# Patient Record
Sex: FEMALE | Race: WHITE | NOT HISPANIC OR LATINO | Employment: PART TIME | URBAN - METROPOLITAN AREA
[De-identification: names, ages, dates, MRNs, and addresses within clinical notes are randomized per-mention and may not be internally consistent; named-entity substitution may affect disease eponyms.]

---

## 2020-02-05 ENCOUNTER — APPOINTMENT (OUTPATIENT)
Dept: GENERAL RADIOLOGY | Facility: HOSPITAL | Age: 70
End: 2020-02-05

## 2020-02-05 ENCOUNTER — HOSPITAL ENCOUNTER (EMERGENCY)
Facility: HOSPITAL | Age: 70
Discharge: HOME | End: 2020-02-05
Admitting: EMERGENCY MEDICINE

## 2020-02-05 VITALS
TEMPERATURE: 97.4 F | OXYGEN SATURATION: 95 % | HEART RATE: 77 BPM | RESPIRATION RATE: 16 BRPM | BODY MASS INDEX: 33.48 KG/M2 | SYSTOLIC BLOOD PRESSURE: 135 MMHG | HEIGHT: 66 IN | DIASTOLIC BLOOD PRESSURE: 82 MMHG | WEIGHT: 208.34 LBS

## 2020-02-05 DIAGNOSIS — S92.912A FRACTURE OF PROXIMAL PHALANX OF TOE OF LEFT FOOT: Primary | ICD-10-CM

## 2020-02-05 PROCEDURE — 73630 X-RAY EXAM OF FOOT: CPT

## 2020-02-05 PROCEDURE — 99283 EMERGENCY DEPT VISIT LOW MDM: CPT

## 2020-02-05 NOTE — DISCHARGE INSTRUCTIONS
Wear splint.  Elevate left foot.  Follow-up with podiatry.  Call tomorrow for appointment.  Return for new or worsening symptoms.

## 2020-02-05 NOTE — ED NOTES
Was putting together a new recliner together and dropped the back of recliner on here left foot. Has swelling and  Bruising to top of left foot and toes     Radha Martines, RN  02/05/20 7555

## 2020-02-05 NOTE — ED PROVIDER NOTES
Subjective   Patient is a 69-year-old white female with history of hypertension, asthma, connective tissue disease, arthritis who presents today with complaints of pain to her left foot and toes.  Patient states yesterday she dropped the back end of a recliner on the end of her foot and toes.  Complains of pain and bruising.  She denies any numbness tingling or weakness distal to the injuries.  She denies any other injury or complaint.          Review of Systems   Constitutional: Negative for fever.   Musculoskeletal:        Pain and bruising left foot   Neurological: Negative for weakness and numbness.       Past Medical History:   Diagnosis Date   • Arthritis    • Asthma    • Connective tissue disease (CMS/HCC)    • Pulmonary fibrosis (CMS/HCC)        Allergies   Allergen Reactions   • Flexeril [Cyclobenzaprine] Other (See Comments)     Hypotension   • Morphine Hallucinations   • Penicillins Other (See Comments)     Childhood   • Sulfa Antibiotics Other (See Comments)     Childhood       Past Surgical History:   Procedure Laterality Date   • APPENDECTOMY     • BARIATRIC SURGERY     • CHOLECYSTECTOMY     • KNEE SURGERY     • SHOULDER SURGERY         History reviewed. No pertinent family history.    Social History     Socioeconomic History   • Marital status:      Spouse name: Not on file   • Number of children: Not on file   • Years of education: Not on file   • Highest education level: Not on file   Tobacco Use   • Smoking status: Never Smoker   Substance and Sexual Activity   • Alcohol use: Yes   • Drug use: Never           Objective   Physical Exam   Constitutional: She appears well-developed.   Vital signs and triage nurse note reviewed.  Constitutional: Awake, alert; well-developed and well-nourished. No acute distress is noted.  Cardiovascular: Regular rate and rhythm  Pulmonary: Respiratory effort regular nonlabored  Musculoskeletal: Independent range of motion of all extremities.  Tenderness over the  dorsal aspect of the left distal foot and all toes.  Pain is greatest over the first and second digits.  Nails are intact.  There is small amount of bruising noted.  There is no edema.  No open wounds.  Distal neurovascular motor intact.  Neuro: Alert oriented x3, speech is clear and appropriate, GCS 15.    Skin: Flesh tone, warm, dry, intact; no erythematous or petechial rash or lesion.        Procedures           ED Course      Labs Reviewed - No data to display  Xr Foot 3+ View Left    Result Date: 2/5/2020  1. Question possible nondisplaced fracture through the proximal phalanx of the second digit. The appearance could also relate to prior arthrodesis across the PIP joint. Correlation with surgical history is recommended. 2. Postsurgical changes of prior bunionectomy and old healed fracture of the fifth metatarsal shaft.  Electronically Signed By-Yosvany Sheehan On:2/5/2020 4:18 PM This report was finalized on [id number] by  Yosvany Sheehan, .    Medications - No data to display                                           MDM  Number of Diagnoses or Management Options  Fracture of proximal phalanx of toe of left foot:   Diagnosis management comments: Comorbidities: Arthritis, hypertension, connective tissue disorder  Differentials: Fracture, dislocation, contusion;this list is not all inclusive and does not constitute the entirety of considered causes  Discussion with provider: Not warranted  Radiology interpretation: X-rays reviewed by me and interpreted by radiologist: As above  Lab interpretation: Labs viewed by me significant for: Not warranted    Patient had x-rays obtained.    The patient had prerna tape and postop shoe applied.  Distal motor, sensory and vascular status intact after application.    Diagnosis and treatment plan discussed with patient.  Patient agreeable to plan.   I discussed findings with patient who voices understanding of discharge instructions, signs and symptoms requiring return to  ED; discharged improved and in stable condition with follow up for re-evaluation.           Amount and/or Complexity of Data Reviewed  Tests in the radiology section of CPT®: reviewed and ordered    Patient Progress  Patient progress: stable      Final diagnoses:   Fracture of proximal phalanx of toe of left foot            Abbey Taveras, APRN  02/05/20 2333

## 2020-02-13 ENCOUNTER — OFFICE VISIT (OUTPATIENT)
Dept: PODIATRY | Facility: CLINIC | Age: 70
End: 2020-02-13

## 2020-02-13 VITALS
SYSTOLIC BLOOD PRESSURE: 106 MMHG | WEIGHT: 206.8 LBS | HEART RATE: 83 BPM | DIASTOLIC BLOOD PRESSURE: 72 MMHG | HEIGHT: 66 IN | BODY MASS INDEX: 33.23 KG/M2

## 2020-02-13 DIAGNOSIS — S90.32XA CONTUSION OF LEFT FOOT, INITIAL ENCOUNTER: Primary | ICD-10-CM

## 2020-02-13 DIAGNOSIS — M20.42 HAMMER TOE OF LEFT FOOT: ICD-10-CM

## 2020-02-13 DIAGNOSIS — I73.9 PERIPHERAL VASCULAR DISEASE (HCC): ICD-10-CM

## 2020-02-13 PROBLEM — M19.019 OSTEOARTHRITIS OF SHOULDER: Status: ACTIVE | Noted: 2020-02-13

## 2020-02-13 PROBLEM — Z95.828 PRESENCE OF IVC FILTER: Status: ACTIVE | Noted: 2018-07-11

## 2020-02-13 PROBLEM — A04.8 H. PYLORI INFECTION: Status: ACTIVE | Noted: 2018-02-19

## 2020-02-13 PROBLEM — D68.9 CLOTTING DISORDER: Status: ACTIVE | Noted: 2018-02-19

## 2020-02-13 PROBLEM — Z98.84 H/O GASTRIC BYPASS: Status: ACTIVE | Noted: 2020-02-13

## 2020-02-13 PROBLEM — M19.90 ARTHRITIS: Status: ACTIVE | Noted: 2018-02-19

## 2020-02-13 PROBLEM — M47.816 LUMBAR ARTHROPATHY: Status: ACTIVE | Noted: 2017-04-03

## 2020-02-13 PROBLEM — R60.0 BILATERAL LOWER EXTREMITY EDEMA: Status: ACTIVE | Noted: 2018-07-11

## 2020-02-13 PROBLEM — J45.909 ASTHMA: Status: ACTIVE | Noted: 2018-07-11

## 2020-02-13 PROBLEM — R94.2 DIFFUSION CAPACITY OF LUNG (DL), DECREASED: Status: ACTIVE | Noted: 2018-02-19

## 2020-02-13 PROBLEM — I51.9 DIASTOLIC DYSFUNCTION, LEFT VENTRICLE: Status: ACTIVE | Noted: 2018-07-11

## 2020-02-13 PROBLEM — R51.9 GENERALIZED HEADACHES: Status: ACTIVE | Noted: 2018-02-19

## 2020-02-13 PROBLEM — R06.02 SOB (SHORTNESS OF BREATH): Status: ACTIVE | Noted: 2018-07-11

## 2020-02-13 PROBLEM — F32.A DEPRESSION: Status: ACTIVE | Noted: 2018-02-19

## 2020-02-13 PROBLEM — M51.36 DEGENERATIVE DISC DISEASE, LUMBAR: Status: ACTIVE | Noted: 2018-02-19

## 2020-02-13 PROBLEM — J84.10 FIBROSIS OF LUNG: Status: ACTIVE | Noted: 2018-02-17

## 2020-02-13 PROBLEM — M35.9 CONNECTIVE TISSUE DISEASE (HCC): Status: ACTIVE | Noted: 2018-02-19

## 2020-02-13 PROBLEM — E03.9 HYPOTHYROIDISM: Status: ACTIVE | Noted: 2020-02-13

## 2020-02-13 PROBLEM — I73.00 RAYNAUD'S DISEASE: Status: ACTIVE | Noted: 2020-02-13

## 2020-02-13 PROBLEM — M81.0 OSTEOPOROSIS: Status: ACTIVE | Noted: 2020-02-13

## 2020-02-13 PROBLEM — Z98.84 HX OF LAPAROSCOPIC GASTRIC BANDING: Status: ACTIVE | Noted: 2020-02-13

## 2020-02-13 PROBLEM — I83.90 VARICOSE VEINS: Status: ACTIVE | Noted: 2020-02-13

## 2020-02-13 PROBLEM — Z95.828 H/O SUPERIOR VENA CAVA FILTER PLACEMENT: Status: ACTIVE | Noted: 2020-02-13

## 2020-02-13 PROBLEM — G47.00 INSOMNIA: Status: ACTIVE | Noted: 2020-02-13

## 2020-02-13 PROCEDURE — 99203 OFFICE O/P NEW LOW 30 MIN: CPT | Performed by: PODIATRIST

## 2020-02-13 RX ORDER — CHOLECALCIFEROL (VITAMIN D3) 1250 MCG
50000 CAPSULE ORAL 3 TIMES WEEKLY
COMMUNITY
Start: 2020-01-15

## 2020-02-13 RX ORDER — ALENDRONATE SODIUM 70 MG/1
70 TABLET ORAL
COMMUNITY
Start: 2019-02-18

## 2020-02-13 RX ORDER — FUROSEMIDE 80 MG
80 TABLET ORAL DAILY
COMMUNITY
Start: 2016-02-29

## 2020-02-13 RX ORDER — ALBUTEROL SULFATE 90 UG/1
2 POWDER, METERED RESPIRATORY (INHALATION) EVERY 4 HOURS PRN
COMMUNITY
Start: 2020-02-12

## 2020-02-13 RX ORDER — OMEPRAZOLE 20 MG/1
20 CAPSULE, DELAYED RELEASE ORAL DAILY
COMMUNITY
Start: 2019-07-29

## 2020-02-13 RX ORDER — POTASSIUM CHLORIDE 20 MEQ/1
TABLET, EXTENDED RELEASE ORAL
COMMUNITY
Start: 2019-07-11

## 2020-02-13 RX ORDER — ROPINIROLE 1 MG/1
2 TABLET, FILM COATED ORAL NIGHTLY
COMMUNITY
Start: 2016-02-29

## 2020-02-13 RX ORDER — CYANOCOBALAMIN 1000 UG/ML
1000 INJECTION, SOLUTION INTRAMUSCULAR; SUBCUTANEOUS
COMMUNITY
Start: 2020-01-14

## 2020-02-13 RX ORDER — LEVOTHYROXINE SODIUM 112 UG/1
112 TABLET ORAL DAILY
COMMUNITY
Start: 2019-07-28

## 2020-02-13 RX ORDER — ALPRAZOLAM 1 MG/1
1 TABLET ORAL AS NEEDED
COMMUNITY
Start: 2019-12-19

## 2020-02-13 RX ORDER — DULOXETIN HYDROCHLORIDE 60 MG/1
60 CAPSULE, DELAYED RELEASE ORAL DAILY
COMMUNITY
Start: 2018-08-19

## 2020-02-13 RX ORDER — NIFEDIPINE 30 MG/1
30 TABLET, EXTENDED RELEASE ORAL DAILY
COMMUNITY
Start: 2019-07-29

## 2020-02-13 RX ORDER — CELECOXIB 200 MG/1
200 CAPSULE ORAL DAILY
COMMUNITY
Start: 2019-02-14

## 2020-02-13 NOTE — PATIENT INSTRUCTIONS
Hammer Toe    Hammer toe is a change in the shape (a deformity) of your toe. The deformity causes the middle joint of your toe to stay bent. This causes pain, especially when you are wearing shoes. Hammer toe starts gradually. At first, the toe can be straightened. Gradually over time, the deformity becomes stiff and permanent.  Early treatments to keep the toe straight may relieve pain. As the deformity becomes stiff and permanent, surgery may be needed to straighten the toe.  What are the causes?  Hammer toe is caused by abnormal bending of the toe joint that is closest to your foot. It happens gradually over time. This pulls on the muscles and connections (tendons) of the toe joint, making them weak and stiff. It is often related to wearing shoes that are too short or narrow and do not let your toes straighten.  What increases the risk?  You may be at greater risk for hammer toe if you:  · Are female.  · Are older.  · Wear shoes that are too small.  · Wear high-heeled shoes that pinch your toes.  · Are a ballet dancer.  · Have a second toe that is longer than your big toe (first toe).  · Injure your foot or toe.  · Have arthritis.  · Have a family history of hammer toe.  · Have a nerve or muscle disorder.  What are the signs or symptoms?  The main symptoms of this condition are pain and deformity of the toe. The pain is worse when wearing shoes, walking, or running. Other symptoms may include:  · Corns or calluses over the bent part of the toe or between the toes.  · Redness and a burning feeling on the toe.  · An open sore that forms on the top of the toe.  · Not being able to straighten the toe.  How is this diagnosed?  This condition is diagnosed based on your symptoms and a physical exam. During the exam, your health care provider will try to straighten your toe to see how stiff the deformity is. You may also have tests, such as:  · A blood test to check for rheumatoid arthritis.  · An X-ray to show how  severe the deformity is.  How is this treated?  Treatment for this condition will depend on how stiff the deformity is. Surgery is often needed. However, sometimes a hammer toe can be straightened without surgery. Treatments that do not involve surgery include:  · Taping the toe into a straightened position.  · Using pads and cushions to protect the toe (orthotics).  · Wearing shoes that provide enough room for the toes.  · Doing toe-stretching exercises at home.  · Taking an NSAID to reduce pain and swelling.  If these treatments do not help or the toe cannot be straightened, surgery is the next option. The most common surgeries used to straighten a hammer toe include:  · Arthroplasty. In this procedure, part of the joint is removed, and that allows the toe to straighten.  · Fusion. In this procedure, cartilage between the two bones of the joint is taken out and the bones are fused together into one longer bone.  · Implantation. In this procedure, part of the bone is removed and replaced with an implant to let the toe move again.  · Flexor tendon transfer. In this procedure, the tendons that curl the toes down (flexor tendons) are repositioned.  Follow these instructions at home:  · Take over-the-counter and prescription medicines only as told by your health care provider.  · Do toe straightening and stretching exercises as told by your health care provider.  · Keep all follow-up visits as told by your health care provider. This is important.  How is this prevented?  · Wear shoes that give your toes enough room and do not cause pain.  · Do not wear high-heeled shoes.  Contact a health care provider if:  · Your pain gets worse.  · Your toe becomes red or swollen.  · You develop an open sore on your toe.  This information is not intended to replace advice given to you by your health care provider. Make sure you discuss any questions you have with your health care provider.  Document Released: 12/15/2001 Document  Revised: 07/16/2018 Document Reviewed: 04/12/2017  [employer] Patient Education © 2019 [employer]

## 2020-02-13 NOTE — PROGRESS NOTES
02/13/2020  Foot and Ankle Surgery - New Patient   Provider: Dr. Steve Pérez DPM  Location: [facility]    Subjective:  Jo Prescott is a 69 y.o. female.     Chief Complaint   Patient presents with   • Left Foot - Pain       HPI: Patient is a 69-year-old female that presents after injury to the left foot.  She states that a piece of her recliner fell onto the dorsum of the left foot.  She reported to the ED after the injury and imaging was performed suspicious of a fracture to the second digit.  Patient states that the pain and swelling has improved but she continues to have discomfort to the forefoot.  She localizes the pain along the distal aspect of the great toe.  She has been ambulating in a postop shoe.  She does complain of longstanding discomfort involving the distal aspects of the third and fourth toes.  She states that she would like these addressed at some point in the future.  She has been told that she has circulation problems.  She denies any other issues today.    Allergies   Allergen Reactions   • Bee Venom Anaphylaxis   • Thien Flavor Swelling   • Adhesive Tape Other (See Comments)     Sensitive   • Flexeril [Cyclobenzaprine] Other (See Comments)     Hypotension   • Metoclopramide Other (See Comments)     Tardive diskinesia  Tardive diskinesia  tardive dyskinesia     • Morphine Hallucinations   • Penicillins Other (See Comments)     Childhood   • Sulfa Antibiotics Other (See Comments)     Childhood       Past Medical History:   Diagnosis Date   • Ankylosing spondylitis (CMS/HCC)    • Arthritis    • Asthma    • Connective tissue disease (CMS/HCC)    • Hypothyroidism 2/13/2020   • Pulmonary fibrosis (CMS/HCC)        Past Surgical History:   Procedure Laterality Date   • APPENDECTOMY     • BARIATRIC SURGERY     • CHOLECYSTECTOMY     • KNEE SURGERY     • SHOULDER SURGERY         Family History   Adopted: Yes   Family history unknown: Yes       Social History     Socioeconomic History    • Marital status:      Spouse name: Not on file   • Number of children: Not on file   • Years of education: Not on file   • Highest education level: Not on file   Tobacco Use   • Smoking status: Never Smoker   • Smokeless tobacco: Never Used   Substance and Sexual Activity   • Alcohol use: Yes   • Drug use: Never   • Sexual activity: Defer        Current Outpatient Medications on File Prior to Visit   Medication Sig Dispense Refill   • alendronate (FOSAMAX) 70 MG tablet TAKE 1 TABLET BY MOUTH  EVERY 7 DAYS     • ALPRAZolam (XANAX) 1 MG tablet      • BREO ELLIPTA 100-25 MCG/INH inhaler      • celecoxib (CeleBREX) 200 MG capsule Take 200 mg by mouth Daily.     • Cholecalciferol (VITAMIN D3) 1.25 MG (08819 UT) capsule TK 1 C PO 3 TIMES A WK     • cyanocobalamin 1000 MCG/ML injection INJECT 1ML IN THE MUSCLE Q 14 DAYS UTD     • DULoxetine (CYMBALTA) 60 MG capsule TAKE 1 CAPSULE BY MOUTH EVERY DAY     • furosemide (LASIX) 80 MG tablet TAKE 1 TABLET BY MOUTH  DAILY     • levothyroxine (SYNTHROID, LEVOTHROID) 112 MCG tablet TAKE 1 TABLET BY MOUTH 6  DAYS A WEEK AND 1/2 TABLET  ON SUNDAY.     • NIFEdipine XL (PROCARDIA XL) 30 MG 24 hr tablet TAKE 1 TABLET BY MOUTH  DAILY     • omeprazole (priLOSEC) 20 MG capsule TAKE 1 CAPSULE BY MOUTH  DAILY     • potassium chloride (K-DUR,KLOR-CON) 20 MEQ CR tablet TAKE 1 TABLET BY MOUTH TWICE DAILY     • PROAIR RESPICLICK 108 (90 Base) MCG/ACT inhaler      • rOPINIRole (REQUIP) 1 MG tablet TAKE 2 TABLETS BY MOUTH  NIGHTLY       No current facility-administered medications on file prior to visit.        Review of Systems:  General: Denies fever, chills, fatigue, and weakness.  Eyes: Denies vision loss, blurry vision, and excessive redness.  ENT: Denies hearing issues and difficulty swallowing.  Cardiovascular: Denies palpitations, chest pain, or syncopal episodes.  Respiratory: Denies shortness of breath, wheezing, and coughing.  GI: Denies abdominal pain, nausea, and vomiting.  "  : Denies frequency, hematuria, and urgency.  Musculoskeletal: + Left foot pain  Derm: Denies rash, open wounds, or suspicious lesions.  Neuro: Denies headaches, numbness, loss of coordination, and tremors.  Psych: Denies anxiety and depression.  Endocrine: Denies temperature intolerance and changes in appetite.  Heme: Denies bleeding disorders or abnormal bruising.     Objective   /72   Pulse 83   Ht 167.6 cm (66\")   Wt 93.8 kg (206 lb 12.8 oz)   BMI 33.38 kg/m²     Foot/Ankle Exam:       General:   Appearance: appears stated age and healthy    Orientation: AAOx3    Affect: appropriate    Gait: antalgic      VASCULAR      Left Foot Vascularity   Dorsalis pedis:  1+  Posterior tibial:  1+  Skin Temperature: warm    Edema Gradin+ and pitting  CFT:  3  Pedal Hair Growth:  Absent  Varicosities: moderate varicosities        NEUROLOGIC     Left Foot Neurologic   Light touch sensation:  Normal  Hot/cold sensation: normal    Protective Sensation using [place]edar-Melodie Monofilament:  10  Achilles reflex:  2+     MUSCULOSKELETAL      Left Foot Musculoskeletal   Ecchymosis:  Toe 1 and toe 2  Tenderness: toe 1    Arch:  Normal  Hammertoe:  Third toe and fourth toe (s/p correction of the second digit which is shortened)     MUSCLE STRENGTH     Left Foot Muscle Strength   Normal strength    Foot dorsiflexion:  5  Foot plantar flexion:  5  Foot inversion:  5  Foot eversion:  5     RANGE OF MOTION      Left Foot Range of Motion   Foot and ankle ROM within normal limits       DERMATOLOGIC     Left Foot Dermatologic   Skin: skin intact        Left Foot Additional Comments: Mild discomfort with palpation to the first digit.  No pain to the second digit.  Semirigid deformities involving the third and fourth toes.  No other pain, deformity, or instability.      Assessment/Plan   Jo was seen today for pain.    Diagnoses and all orders for this visit:    Contusion of left foot, initial encounter    Peripheral " vascular disease (CMS/[facility])  -     Duplex Lower Extremity Art / Grafts - Bilateral CAR; Future    Hammer toe of left foot      Patient presents approximately 1 week after injury to the left foot.  Imaging was reviewed which is suspicious for fracture involving the second digit; however, I do feel that this finding is due to previous hammer digit surgery given that she is relatively asymptomatic to this toe.  No other fractures or dislocations are identified.  I do feel that her primary issue for evaluation today is a contusion.  We did discuss the diagnosis and treatment options.  I have asked that she remain in the postop shoe with normal activity.  She is to call with any additional issues or concerns.  She states that she would like to discuss hammer digit surgery in the future for her third and fourth toe as they cause ongoing problems for her.  Her pedal pulses were weakly palpable today and she states that she does have a history of circulation issues.  I have recommended that we proceed with ABIs for further evaluation.  We will discuss the results of the ABIs and future plans for the toes given that the forefoot discomfort has improved at follow-up.  I will see her in 4 weeks for reevaluation.    No orders of the defined types were placed in this encounter.       Note is dictated utilizing voice recognition software. Unfortunately this leads to occasional typographical errors. I apologize in advance if the situation occurs. If questions occur please do not hesitate to call our office.

## 2020-02-21 ENCOUNTER — APPOINTMENT (OUTPATIENT)
Dept: CARDIOLOGY | Facility: HOSPITAL | Age: 70
End: 2020-02-21

## 2020-02-28 ENCOUNTER — APPOINTMENT (OUTPATIENT)
Dept: CARDIOLOGY | Facility: HOSPITAL | Age: 70
End: 2020-02-28

## 2020-03-06 ENCOUNTER — HOSPITAL ENCOUNTER (OUTPATIENT)
Dept: CARDIOLOGY | Facility: HOSPITAL | Age: 70
Discharge: HOME | End: 2020-03-06
Admitting: PODIATRIST

## 2020-03-06 DIAGNOSIS — I73.9 PERIPHERAL VASCULAR DISEASE (HCC): ICD-10-CM

## 2020-03-06 LAB
BH CV LOWER ARTERIAL LEFT ABI RATIO: 1.37
BH CV LOWER ARTERIAL LEFT DORSALIS PEDIS SYS MAX: 149 MMHG
BH CV LOWER ARTERIAL LEFT GREAT TOE SYS MAX: 68 MMHG
BH CV LOWER ARTERIAL LEFT POST TIBIAL SYS MAX: 162 MMHG
BH CV LOWER ARTERIAL LEFT TBI RATIO: 0.58
BH CV LOWER ARTERIAL RIGHT ABI RATIO: 1.39
BH CV LOWER ARTERIAL RIGHT DORSALIS PEDIS SYS MAX: 159 MMHG
BH CV LOWER ARTERIAL RIGHT GREAT TOE SYS MAX: 67 MMHG
BH CV LOWER ARTERIAL RIGHT POST TIBIAL SYS MAX: 164 MMHG
BH CV LOWER ARTERIAL RIGHT TBI RATIO: 0.57
UPPER ARTERIAL LEFT ARM BRACHIAL SYS MAX: 112 MMHG
UPPER ARTERIAL RIGHT ARM BRACHIAL SYS MAX: 118 MMHG

## 2020-03-06 PROCEDURE — 93922 UPR/L XTREMITY ART 2 LEVELS: CPT

## 2020-03-12 ENCOUNTER — OFFICE VISIT (OUTPATIENT)
Dept: PODIATRY | Facility: CLINIC | Age: 70
End: 2020-03-12

## 2020-03-12 VITALS
HEIGHT: 66 IN | DIASTOLIC BLOOD PRESSURE: 81 MMHG | SYSTOLIC BLOOD PRESSURE: 130 MMHG | WEIGHT: 208 LBS | BODY MASS INDEX: 33.43 KG/M2 | HEART RATE: 75 BPM

## 2020-03-12 DIAGNOSIS — M20.42 HAMMER TOE OF LEFT FOOT: Primary | ICD-10-CM

## 2020-03-12 PROCEDURE — 99213 OFFICE O/P EST LOW 20 MIN: CPT | Performed by: PODIATRIST

## 2020-03-12 NOTE — PROGRESS NOTES
03/12/2020  Foot and Ankle Surgery - Established Patient/Follow-up  Provider: Dr. Steve Pérez DPM  Location: [facility]    Subjective:  Jo Prescott is a 70 y.o. female.     Chief Complaint   Patient presents with   • Left Foot - Follow-up       HPI: Patient returns and states that she is doing better than last exam but she continues to have prominent discomfort involving her left second third and fourth toes.  She wants to proceed with toe correction if possible.  She did have the ABIs performed.  She denies any other issues with her feet.    Allergies   Allergen Reactions   • Bee Venom Anaphylaxis   • Thien Flavor Swelling   • Adhesive Tape Other (See Comments)     Sensitive   • Flexeril [Cyclobenzaprine] Other (See Comments)     Hypotension   • Metoclopramide Other (See Comments)     Tardive diskinesia  Tardive diskinesia  tardive dyskinesia     • Morphine Hallucinations   • Penicillins Other (See Comments)     Childhood   • Sulfa Antibiotics Other (See Comments)     Childhood       Current Outpatient Medications on File Prior to Visit   Medication Sig Dispense Refill   • alendronate (FOSAMAX) 70 MG tablet TAKE 1 TABLET BY MOUTH  EVERY 7 DAYS     • ALPRAZolam (XANAX) 1 MG tablet      • BREO ELLIPTA 100-25 MCG/INH inhaler      • celecoxib (CeleBREX) 200 MG capsule Take 200 mg by mouth Daily.     • Cholecalciferol (VITAMIN D3) 1.25 MG (29909 UT) capsule TK 1 C PO 3 TIMES A WK     • cyanocobalamin 1000 MCG/ML injection INJECT 1ML IN THE MUSCLE Q 14 DAYS UTD     • DULoxetine (CYMBALTA) 60 MG capsule TAKE 1 CAPSULE BY MOUTH EVERY DAY     • furosemide (LASIX) 80 MG tablet TAKE 1 TABLET BY MOUTH  DAILY     • levothyroxine (SYNTHROID, LEVOTHROID) 112 MCG tablet TAKE 1 TABLET BY MOUTH 6  DAYS A WEEK AND 1/2 TABLET  ON SUNDAY.     • NIFEdipine XL (PROCARDIA XL) 30 MG 24 hr tablet TAKE 1 TABLET BY MOUTH  DAILY     • omeprazole (priLOSEC) 20 MG capsule TAKE 1 CAPSULE BY MOUTH  DAILY     • potassium chloride  "(K-DUR,KLOR-CON) 20 MEQ CR tablet TAKE 1 TABLET BY MOUTH TWICE DAILY     • PROAIR RESPICLICK 108 (90 Base) MCG/ACT inhaler      • rOPINIRole (REQUIP) 1 MG tablet TAKE 2 TABLETS BY MOUTH  NIGHTLY       No current facility-administered medications on file prior to visit.        Objective   /81   Pulse 75   Ht 167.6 cm (66\")   Wt 94.3 kg (208 lb)   BMI 33.57 kg/m²     General:   Appearance: appears stated age and healthy    Orientation: AAOx3    Affect: appropriate    Gait: antalgic       VASCULAR       Left Foot Vascularity   Dorsalis pedis:  1+  Posterior tibial:  1+  Skin Temperature: warm    Edema Gradin+ and pitting  CFT:  3  Pedal Hair Growth:  Absent  Varicosities: moderate varicosities        NEUROLOGIC      Left Foot Neurologic   Light touch sensation:  Normal  Hot/cold sensation: normal    Protective Sensation using [place]-Melodie Monofilament:  10  Achilles reflex:  2+      MUSCULOSKELETAL       Left Foot Musculoskeletal   Ecchymosis:  Toe 1 and toe 2  Tenderness: toe 1    Arch:  Normal  Hammertoe:  Third toe and fourth toe (s/p correction of the second digit which is shortened)      MUSCLE STRENGTH      Left Foot Muscle Strength   Normal strength    Foot dorsiflexion:  5  Foot plantar flexion:  5  Foot inversion:  5  Foot eversion:  5      RANGE OF MOTION       Left Foot Range of Motion   Foot and ankle ROM within normal limits        DERMATOLOGIC      Left Foot Dermatologic   Skin: skin intact        Left Foot Additional Comments: Mild discomfort with palpation to the first digit.  No pain to the second digit.  Semirigid deformities involving the third and fourth toes.  No other pain, deformity, or instability.    Assessment/Plan   Jo was seen today for follow-up.    Diagnoses and all orders for this visit:    Hammer toe of left foot  -     CBC (No Diff); Future  -     Basic Metabolic Panel; Future  -     ECG 12 Lead; Future  -     XR Chest 2 View; Future  -     Case Request; " Standing  -     Case Request    Other orders  -     Follow Anesthesia Guidelines / Standing Orders; Future  -     Obtain Informed Consent; Future  -     Provide NPO Instructions to Patient; Future  -     Chlorhexidine Skin Prep; Future      Patient returns with continued discomfort involving the third and fourth toes.  Her physical exam is relatively unchanged and stable.  She did have the ABIs which were reviewed showing no significant occlusive disease.  Patient has tried multiple conservative treatments for the hammertoes and states that she is eager for correction.  She is tired of dealing with these issues.  We did discuss hammertoe corrections of the third and fourth toes.  We reviewed the risks, benefits, and recovery at length.  We did review potential issues of wound healing complications.  All questions were answered to patient satisfaction.  We will attempt to proceed with surgery in the near future.    Orders Placed This Encounter   Procedures   • XR Chest 2 View     Standing Status:   Future     Standing Expiration Date:   3/12/2021     Order Specific Question:   Reason for Exam:     Answer:   Preop   • CBC (No Diff)     Standing Status:   Future     Standing Expiration Date:   3/12/2021   • Basic Metabolic Panel     Standing Status:   Future     Standing Expiration Date:   3/12/2021   • Follow Anesthesia Guidelines / Standing Orders     Standing Status:   Future   • Obtain Informed Consent     Standing Status:   Future     Order Specific Question:   Informed Consent Given For     Answer:   Hammer digit correction of the third and fourth digits with arthrodesis of the proximal interphalangeal joint of the left foot   • Provide NPO Instructions to Patient     Standing Status:   Future   • Chlorhexidine Skin Prep     Chlorhexidine Skin Prep and Instructions For All Patients Having A Procedure Requiring an Outward Incision if Not Allergic. If Allergic, Give Antibacterial Skin Wipes and Instructions. Do Not  Use For Facial Cases or on Any Mucus Membranes.     Standing Status:   Future   • ECG 12 Lead     Standing Status:   Future     Standing Expiration Date:   3/12/2021     Order Specific Question:   Reason for Exam:     Answer:   Preop          Note is dictated utilizing voice recognition software. Unfortunately this leads to occasional typographical errors. I apologize in advance if the situation occurs. If questions occur please do not hesitate to call our office.

## 2020-03-31 ENCOUNTER — TELEPHONE (OUTPATIENT)
Dept: ORTHOPEDIC SURGERY | Facility: CLINIC | Age: 70
End: 2020-03-31

## 2020-04-16 ENCOUNTER — APPOINTMENT (OUTPATIENT)
Dept: PREADMISSION TESTING | Facility: HOSPITAL | Age: 70
End: 2020-04-16

## 2020-06-02 ENCOUNTER — APPOINTMENT (OUTPATIENT)
Dept: GENERAL RADIOLOGY | Facility: HOSPITAL | Age: 70
End: 2020-06-02

## 2020-06-02 ENCOUNTER — APPOINTMENT (OUTPATIENT)
Dept: CARDIOLOGY | Facility: HOSPITAL | Age: 70
End: 2020-06-02

## 2020-06-03 ENCOUNTER — HOSPITAL ENCOUNTER (OUTPATIENT)
Dept: GENERAL RADIOLOGY | Facility: HOSPITAL | Age: 70
Discharge: HOME | End: 2020-06-03

## 2020-06-03 ENCOUNTER — LAB (OUTPATIENT)
Dept: LAB | Facility: HOSPITAL | Age: 70
End: 2020-06-03

## 2020-06-03 ENCOUNTER — HOSPITAL ENCOUNTER (OUTPATIENT)
Dept: CARDIOLOGY | Facility: HOSPITAL | Age: 70
Discharge: HOME | End: 2020-06-03
Admitting: PODIATRIST

## 2020-06-03 DIAGNOSIS — M20.42 HAMMER TOE OF LEFT FOOT: ICD-10-CM

## 2020-06-03 LAB
ANION GAP SERPL CALCULATED.3IONS-SCNC: 12.9 MMOL/L (ref 5–15)
BUN BLD-MCNC: 16 MG/DL (ref 8–23)
BUN/CREAT SERPL: 20 (ref 7–25)
CALCIUM SPEC-SCNC: 9 MG/DL (ref 8.6–10.5)
CHLORIDE SERPL-SCNC: 103 MMOL/L (ref 98–107)
CO2 SERPL-SCNC: 25.1 MMOL/L (ref 22–29)
CREAT BLD-MCNC: 0.8 MG/DL (ref 0.57–1)
DEPRECATED RDW RBC AUTO: 47.9 FL (ref 37–54)
ERYTHROCYTE [DISTWIDTH] IN BLOOD BY AUTOMATED COUNT: 13.4 % (ref 12.3–15.4)
GFR SERPL CREATININE-BSD FRML MDRD: 71 ML/MIN/1.73
GLUCOSE BLD-MCNC: 88 MG/DL (ref 65–99)
HCT VFR BLD AUTO: 39.8 % (ref 34–46.6)
HGB BLD-MCNC: 13.2 G/DL (ref 12–15.9)
MCH RBC QN AUTO: 31.7 PG (ref 26.6–33)
MCHC RBC AUTO-ENTMCNC: 33.2 G/DL (ref 31.5–35.7)
MCV RBC AUTO: 95.4 FL (ref 79–97)
PLATELET # BLD AUTO: 244 10*3/MM3 (ref 140–450)
PMV BLD AUTO: 10.5 FL (ref 6–12)
POTASSIUM BLD-SCNC: 3.6 MMOL/L (ref 3.5–5.2)
RBC # BLD AUTO: 4.17 10*6/MM3 (ref 3.77–5.28)
SODIUM BLD-SCNC: 141 MMOL/L (ref 136–145)
WBC NRBC COR # BLD: 5.26 10*3/MM3 (ref 3.4–10.8)

## 2020-06-03 PROCEDURE — C9803 HOPD COVID-19 SPEC COLLECT: HCPCS

## 2020-06-03 PROCEDURE — U0004 COV-19 TEST NON-CDC HGH THRU: HCPCS

## 2020-06-03 PROCEDURE — 71046 X-RAY EXAM CHEST 2 VIEWS: CPT

## 2020-06-03 PROCEDURE — 80048 BASIC METABOLIC PNL TOTAL CA: CPT

## 2020-06-03 PROCEDURE — 93005 ELECTROCARDIOGRAM TRACING: CPT | Performed by: PODIATRIST

## 2020-06-03 PROCEDURE — 36415 COLL VENOUS BLD VENIPUNCTURE: CPT

## 2020-06-03 PROCEDURE — 85027 COMPLETE CBC AUTOMATED: CPT

## 2020-06-04 ENCOUNTER — ANESTHESIA EVENT (OUTPATIENT)
Dept: PERIOP | Facility: HOSPITAL | Age: 70
End: 2020-06-04

## 2020-06-04 LAB
REF LAB TEST METHOD: NORMAL
SARS-COV-2 RNA RESP QL NAA+PROBE: NOT DETECTED

## 2020-06-04 PROCEDURE — 93010 ELECTROCARDIOGRAM REPORT: CPT | Performed by: INTERNAL MEDICINE

## 2020-06-04 NOTE — ANESTHESIA PREPROCEDURE EVALUATION
Anesthesia Evaluation     Patient summary reviewed and Nursing notes reviewed   no history of anesthetic complications:  NPO Solid Status: > 8 hours  NPO Liquid Status: > 8 hours           Airway   Dental      Pulmonary    (+) asthma,shortness of breath,   Cardiovascular     ECG reviewed    (+) DVT,       Neuro/Psych  (+) headaches, psychiatric history Depression and Anxiety,     GI/Hepatic/Renal/Endo    (+) obesity,  GERD,  thyroid problem hypothyroidism    Musculoskeletal     Abdominal    Substance History      OB/GYN          Other   arthritis,      ROS/Med Hx Other: Pulmonary fibrosis, ankylosing spondylitis, Raynaud;s disease, osteoporosis, low vit D and B12, insomnia, diastolic dysfunction, DDD, clotting disorder    PSH  CHOLECYSTECTOMY APPENDECTOMY  SHOULDER SURGERY KNEE SURGERY  BARIATRIC SURGERY                       Anesthesia Plan    ASA 3     MAC   (Patient identified; pre-operative vital signs, all relevant labs/studies, complete medical/surgical/anesthetic history, full medication list, full allergy list, and NPO status obtained/reviewed; physical assessment performed; anesthetic options, side effects, potential complications, risks, and benefits discussed; questions answered; written anesthesia consent obtained; patient cleared for procedure; anesthesia machine and equipment checked and functioning)    Anesthetic plan, all risks, benefits, and alternatives have been provided, discussed and informed consent has been obtained with: patient.    Plan discussed with CAA.

## 2020-06-05 ENCOUNTER — APPOINTMENT (OUTPATIENT)
Dept: GENERAL RADIOLOGY | Facility: HOSPITAL | Age: 70
End: 2020-06-05

## 2020-06-05 ENCOUNTER — HOSPITAL ENCOUNTER (OUTPATIENT)
Facility: HOSPITAL | Age: 70
Setting detail: HOSPITAL OUTPATIENT SURGERY
Discharge: HOME | End: 2020-06-05
Attending: PODIATRIST | Admitting: PODIATRIST

## 2020-06-05 ENCOUNTER — ANESTHESIA (OUTPATIENT)
Dept: PERIOP | Facility: HOSPITAL | Age: 70
End: 2020-06-05

## 2020-06-05 VITALS
WEIGHT: 209 LBS | BODY MASS INDEX: 33.59 KG/M2 | TEMPERATURE: 97 F | OXYGEN SATURATION: 96 % | HEIGHT: 66 IN | SYSTOLIC BLOOD PRESSURE: 120 MMHG | RESPIRATION RATE: 20 BRPM | DIASTOLIC BLOOD PRESSURE: 60 MMHG | HEART RATE: 69 BPM

## 2020-06-05 DIAGNOSIS — M20.42 HAMMER TOE OF LEFT FOOT: ICD-10-CM

## 2020-06-05 PROCEDURE — 73630 X-RAY EXAM OF FOOT: CPT

## 2020-06-05 PROCEDURE — C1713 ANCHOR/SCREW BN/BN,TIS/BN: HCPCS | Performed by: PODIATRIST

## 2020-06-05 PROCEDURE — 28285 REPAIR OF HAMMERTOE: CPT | Performed by: PODIATRIST

## 2020-06-05 PROCEDURE — S0260 H&P FOR SURGERY: HCPCS | Performed by: PODIATRIST

## 2020-06-05 PROCEDURE — 25010000003 LIDOCAINE 1 % SOLUTION: Performed by: PODIATRIST

## 2020-06-05 PROCEDURE — 25010000002 PROPOFOL 10 MG/ML EMULSION: Performed by: ANESTHESIOLOGY

## 2020-06-05 DEVICE — IMPLANTABLE DEVICE
Type: IMPLANTABLE DEVICE | Site: TOE FOURTH | Status: FUNCTIONAL
Brand: MICROAIRE®

## 2020-06-05 RX ORDER — FENTANYL CITRATE 50 UG/ML
25 INJECTION, SOLUTION INTRAMUSCULAR; INTRAVENOUS
Status: DISCONTINUED | OUTPATIENT
Start: 2020-06-05 | End: 2020-06-05 | Stop reason: HOSPADM

## 2020-06-05 RX ORDER — CLINDAMYCIN PHOSPHATE 900 MG/50ML
900 INJECTION, SOLUTION INTRAVENOUS ONCE
Status: COMPLETED | OUTPATIENT
Start: 2020-06-05 | End: 2020-06-05

## 2020-06-05 RX ORDER — SODIUM CHLORIDE, SODIUM LACTATE, POTASSIUM CHLORIDE, CALCIUM CHLORIDE 600; 310; 30; 20 MG/100ML; MG/100ML; MG/100ML; MG/100ML
9 INJECTION, SOLUTION INTRAVENOUS CONTINUOUS PRN
Status: DISCONTINUED | OUTPATIENT
Start: 2020-06-05 | End: 2020-06-05 | Stop reason: HOSPADM

## 2020-06-05 RX ORDER — IPRATROPIUM BROMIDE AND ALBUTEROL SULFATE 2.5; .5 MG/3ML; MG/3ML
3 SOLUTION RESPIRATORY (INHALATION) ONCE AS NEEDED
Status: DISCONTINUED | OUTPATIENT
Start: 2020-06-05 | End: 2020-06-05 | Stop reason: HOSPADM

## 2020-06-05 RX ORDER — ONDANSETRON 2 MG/ML
4 INJECTION INTRAMUSCULAR; INTRAVENOUS ONCE AS NEEDED
Status: DISCONTINUED | OUTPATIENT
Start: 2020-06-05 | End: 2020-06-05 | Stop reason: HOSPADM

## 2020-06-05 RX ORDER — SODIUM CHLORIDE 0.9 % (FLUSH) 0.9 %
10 SYRINGE (ML) INJECTION AS NEEDED
Status: DISCONTINUED | OUTPATIENT
Start: 2020-06-05 | End: 2020-06-05 | Stop reason: HOSPADM

## 2020-06-05 RX ORDER — HYDROCODONE BITARTRATE AND ACETAMINOPHEN 10; 325 MG/1; MG/1
1 TABLET ORAL EVERY 4 HOURS PRN
Status: DISCONTINUED | OUTPATIENT
Start: 2020-06-05 | End: 2020-06-05 | Stop reason: HOSPADM

## 2020-06-05 RX ORDER — PROMETHAZINE HYDROCHLORIDE 25 MG/1
25 SUPPOSITORY RECTAL ONCE AS NEEDED
Status: DISCONTINUED | OUTPATIENT
Start: 2020-06-05 | End: 2020-06-05 | Stop reason: HOSPADM

## 2020-06-05 RX ORDER — LIDOCAINE HYDROCHLORIDE 10 MG/ML
INJECTION, SOLUTION INFILTRATION; PERINEURAL AS NEEDED
Status: DISCONTINUED | OUTPATIENT
Start: 2020-06-05 | End: 2020-06-05 | Stop reason: HOSPADM

## 2020-06-05 RX ORDER — PROMETHAZINE HYDROCHLORIDE 25 MG/1
25 TABLET ORAL ONCE AS NEEDED
Status: DISCONTINUED | OUTPATIENT
Start: 2020-06-05 | End: 2020-06-05 | Stop reason: HOSPADM

## 2020-06-05 RX ORDER — DIPHENHYDRAMINE HYDROCHLORIDE 50 MG/ML
12.5 INJECTION INTRAMUSCULAR; INTRAVENOUS
Status: DISCONTINUED | OUTPATIENT
Start: 2020-06-05 | End: 2020-06-05 | Stop reason: HOSPADM

## 2020-06-05 RX ORDER — PROMETHAZINE HYDROCHLORIDE 25 MG/ML
6.25 INJECTION, SOLUTION INTRAMUSCULAR; INTRAVENOUS ONCE AS NEEDED
Status: DISCONTINUED | OUTPATIENT
Start: 2020-06-05 | End: 2020-06-05 | Stop reason: HOSPADM

## 2020-06-05 RX ORDER — MEPERIDINE HYDROCHLORIDE 25 MG/ML
12.5 INJECTION INTRAMUSCULAR; INTRAVENOUS; SUBCUTANEOUS
Status: DISCONTINUED | OUTPATIENT
Start: 2020-06-05 | End: 2020-06-05 | Stop reason: HOSPADM

## 2020-06-05 RX ORDER — HYDROMORPHONE HCL 110MG/55ML
0.5 PATIENT CONTROLLED ANALGESIA SYRINGE INTRAVENOUS
Status: DISCONTINUED | OUTPATIENT
Start: 2020-06-05 | End: 2020-06-05 | Stop reason: HOSPADM

## 2020-06-05 RX ORDER — SODIUM CHLORIDE 0.9 % (FLUSH) 0.9 %
10 SYRINGE (ML) INJECTION EVERY 12 HOURS SCHEDULED
Status: DISCONTINUED | OUTPATIENT
Start: 2020-06-05 | End: 2020-06-05 | Stop reason: HOSPADM

## 2020-06-05 RX ORDER — HYDRALAZINE HYDROCHLORIDE 20 MG/ML
5 INJECTION INTRAMUSCULAR; INTRAVENOUS
Status: DISCONTINUED | OUTPATIENT
Start: 2020-06-05 | End: 2020-06-05 | Stop reason: HOSPADM

## 2020-06-05 RX ORDER — HYDROCODONE BITARTRATE AND ACETAMINOPHEN 5; 325 MG/1; MG/1
1 TABLET ORAL ONCE AS NEEDED
Status: DISCONTINUED | OUTPATIENT
Start: 2020-06-05 | End: 2020-06-05 | Stop reason: HOSPADM

## 2020-06-05 RX ORDER — NALOXONE HCL 0.4 MG/ML
0.4 VIAL (ML) INJECTION AS NEEDED
Status: DISCONTINUED | OUTPATIENT
Start: 2020-06-05 | End: 2020-06-05 | Stop reason: HOSPADM

## 2020-06-05 RX ORDER — HYDROCODONE BITARTRATE AND ACETAMINOPHEN 7.5; 325 MG/1; MG/1
1 TABLET ORAL EVERY 6 HOURS PRN
Qty: 28 TABLET | Refills: 0 | Status: SHIPPED | OUTPATIENT
Start: 2020-06-05

## 2020-06-05 RX ORDER — LIDOCAINE HYDROCHLORIDE 10 MG/ML
0.5 INJECTION, SOLUTION EPIDURAL; INFILTRATION; INTRACAUDAL; PERINEURAL ONCE AS NEEDED
Status: DISCONTINUED | OUTPATIENT
Start: 2020-06-05 | End: 2020-06-05 | Stop reason: HOSPADM

## 2020-06-05 RX ADMIN — SODIUM CHLORIDE, SODIUM LACTATE, POTASSIUM CHLORIDE, AND CALCIUM CHLORIDE 9 ML/HR: 600; 310; 30; 20 INJECTION, SOLUTION INTRAVENOUS at 10:40

## 2020-06-05 RX ADMIN — CLINDAMYCIN PHOSPHATE 900 MG: 900 INJECTION, SOLUTION INTRAVENOUS at 12:50

## 2020-06-05 RX ADMIN — PROPOFOL 75 MCG/KG/MIN: 10 INJECTION, EMULSION INTRAVENOUS at 12:39

## 2020-06-05 RX ADMIN — HYDROCODONE BITARTRATE AND ACETAMINOPHEN 1 TABLET: 10; 325 TABLET ORAL at 13:58

## 2020-06-05 NOTE — OP NOTE
Operative Note   Foot and Ankle Surgery   Provider: Dr. Steve Pérez   Location: [facility]      Procedure:  1.  Hammer digit correction with proximal interphalangeal joint arthrodesis, left third toe  2. Hammer digit correction with proximal interphalangeal joint arthrodesis, left fourth toe    Pre-operative Diagnosis:   1.  Hammer digit deformity, left third and fourth toe    Post-operative Diagnosis: Same    Surgeon: Steve Pérez    Assistant: None    Anesthesia: MAC    Implants: 0.045 K wires    Findings: No unexpected findings    Specimen: None    Blood Loss: Less than 5cc    Complications: None    Post Op Plan: Discharge home.  Partial weightbearing activity as needed in cam boot.  Follow-up with me in 2 weeks    Summary:    Patient is a 70-year-old female that has been seen in office for issues involving her left third and fourth toes.  She does complain of hammer digit deformities which are longstanding.  She has had previous work performed on her left foot.  She states that pain is present with weightbearing activity and most shoes.  She would like to discuss surgical correction.  We did review treatment options in office including hammer digit correction with arthrodesis of proximal phalangeal joint.  She wishes to proceed.    Procedure, risks, complications, and goals were discussed with the patient at bedside.  Risks include but are not limited to infection, complications from anesthesia (including death), chronic pain or numbness, hematoma/seroma, deep vein thrombosis, wound complications, and potential for additional surgical procedures.  Patient understands and elects to proceed with surgery at this time. Informed consent was obtained before proceeding to the operating suite.  All questions were answered to the patient's satisfaction. No guarantees or assurances were given or implied.    Procedure:    Patient was brought to the operating room and placed in the operative table in supine  position.  Once adequate sedation was achieved, a pneumatic tourniquet was placed about the patient's left ankle.  The left lower extremity was scrubbed prepped and draped in usual sterile fashion.  The limb was elevated and exsanguinated and the pneumatic tourniquet was inflated to 250 mmHg.  A formal timeout was conducted prior skin incision.    Attention was then directed to the dorsal aspect of the third digit.  A linear longitudinal incision was performed over the proximal interphalangeal joint region.  Dissection was performed in layers to the level of the capsule.  A transverse capsulotomy was performed exposing the base of the middle phalanx and head of the proximal phalanx.  A sagittal saw was then used to resect the joint in the standard fashion.  The wound was irrigated with copious amounts of normal saline.  Definitive fixation was achieved utilizing a 0.045 K wire which was advanced through the base of the middle phalanx and out the distal aspect of the digit.  The wire was then retrograded through the proximal phalanx and into the third metatarsal head.  Stability and rectus alignment of the digit was noted.  The wound was again irrigated and the extensor tendon was reapproximated with a 4-0 Vicryl.  The skin was closed with a 4-0 nylon.  The K wire was bent and capped.    Correction of the fourth digit was performed in the same manner as described above.    The digits were dressed with Xeroform and sterile compressive dressings.  The tourniquet was released and prompt hyperemic response was noted to all digits of the left foot.  She tolerated the procedure and anesthesia well.  She was transferred from the operating room to the recovery room vital signs stable and nervous status unchanged to the left lower extremity.      Dr. Steve Pérez, VISHAL  [facility]  [phone]    Note is dictated utilizing voice recognition software. Unfortunately this leads to occasional typographical errors.  I apologize in advance if the situation occurs. If questions occur please do not hesitate to call our office.

## 2020-06-05 NOTE — ANESTHESIA POSTPROCEDURE EVALUATION
Patient: Jo Prescott    Procedure Summary     Date:  06/05/20 Room / Location:  [facility] OR 09 / [facility] MAIN OR    Anesthesia Start:  1230 Anesthesia Stop:  1335    Procedure:  HAMMER TOE REPAIR third and fourth toe (Left Toes) Diagnosis:       Hammer toe of left foot      (Hammer toe of left foot [M20.42])    Surgeon:  TRELL Pérez DPM Provider:  Arron Siegel MD    Anesthesia Type:  MAC ASA Status:  3          Anesthesia Type: MAC    Vitals  Vitals Value Taken Time   /55 6/5/2020  2:03 PM   Temp 98.1 °F (36.7 °C) 6/5/2020  1:31 PM   Pulse 67 6/5/2020  2:03 PM   Resp 15 6/5/2020  2:00 PM   SpO2 99 % 6/5/2020  2:03 PM   Vitals shown include unvalidated device data.        Post Anesthesia Care and Evaluation    Patient location during evaluation: PACU  Patient participation: complete - patient participated  Level of consciousness: awake  Pain scale: See nurse's notes for pain score.  Pain management: adequate  Airway patency: patent  Anesthetic complications: No anesthetic complications  PONV Status: none  Cardiovascular status: acceptable  Respiratory status: acceptable  Hydration status: acceptable    Comments: Patient seen and examined postoperatively; vital signs stable; SpO2 greater than or equal to 90%; cardiopulmonary status stable; nausea/vomiting adequately controlled; pain adequately controlled; no apparent anesthesia complications; patient discharged from anesthesia care when discharge criteria were met

## 2020-06-05 NOTE — H&P
6/5/20  Foot and Ankle Surgery - Pre-Op H&P  Provider: Dr. Steve Pérez DPM  Location: [facility]    Subjective:  Jo Prescott is a 70 y.o. female.     CC: left foot pain    HPI: Patient presents for surgery involving her left third and fourth toes.  She has been treated conservatively and is tired of dealing with the pain.  No new issues    Allergies   Allergen Reactions   • Bee Venom Anaphylaxis   • Thien Flavor Swelling   • Adhesive Tape Other (See Comments)     Sensitive   • Flexeril [Cyclobenzaprine] Other (See Comments)     Hypotension   • Metoclopramide Other (See Comments)     Tardive diskinesia  Tardive diskinesia  tardive dyskinesia     • Morphine Hallucinations   • Penicillins Other (See Comments)     Childhood   • Sulfa Antibiotics Other (See Comments)     Childhood       Past Medical History:   Diagnosis Date   • Ankylosing spondylitis (CMS/[facility])    • Arthritis    • Asthma    • Connective tissue disease (CMS/[facility])    • GERD (gastroesophageal reflux disease)    • Hypothyroidism 2/13/2020   • Pulmonary fibrosis (CMS/[facility])    • Raynaud's disease        Past Surgical History:   Procedure Laterality Date   • APPENDECTOMY     • BARIATRIC SURGERY     • CHOLECYSTECTOMY     • KNEE SURGERY     • SHOULDER SURGERY         Family History   Adopted: Yes   Family history unknown: Yes       Social History     Socioeconomic History   • Marital status:      Spouse name: Not on file   • Number of children: Not on file   • Years of education: Not on file   • Highest education level: Not on file   Tobacco Use   • Smoking status: Never Smoker   • Smokeless tobacco: Never Used   Substance and Sexual Activity   • Alcohol use: Yes   • Drug use: Never   • Sexual activity: Defer        No current facility-administered medications on file prior to encounter.      Current Outpatient Medications on File Prior to Encounter   Medication Sig Dispense Refill   • alendronate (FOSAMAX) 70 MG tablet Take 70 mg by mouth  Every 7 (Seven) Days. tues     • ALPRAZolam (XANAX) 1 MG tablet Take 1 mg by mouth As Needed.     • BREO ELLIPTA 100-25 MCG/INH inhaler Inhale 1 puff Daily.     • celecoxib (CeleBREX) 200 MG capsule Take 200 mg by mouth Daily. Hold 7 days prior to surgery     • Cholecalciferol (VITAMIN D3) 1.25 MG (36421 UT) capsule Take 50,000 Units by mouth 3 (Three) Times a Week.     • cyanocobalamin 1000 MCG/ML injection Inject 1,000 mcg into the appropriate muscle as directed by prescriber Every 14 (Fourteen) Days.     • DULoxetine (CYMBALTA) 60 MG capsule Take 60 mg by mouth Daily.     • furosemide (LASIX) 80 MG tablet Take 80 mg by mouth Daily. Hold DOS     • levothyroxine (SYNTHROID, LEVOTHROID) 112 MCG tablet Take 112 mcg by mouth Daily.     • NIFEdipine XL (PROCARDIA XL) 30 MG 24 hr tablet Take 30 mg by mouth Daily. 6 months out of the year Oct through April     • omeprazole (priLOSEC) 20 MG capsule Take 20 mg by mouth Daily.     • PROAIR RESPICLICK 108 (90 Base) MCG/ACT inhaler Inhale 2 puffs Every 4 (Four) Hours As Needed for Shortness of Air.     • rOPINIRole (REQUIP) 1 MG tablet Take 2 mg by mouth Every Night.     • potassium chloride (K-DUR,KLOR-CON) 20 MEQ CR tablet TAKE 1 TABLET BY MOUTH TWICE DAILY         Review of Systems:  General: Denies fever, chills, fatigue, and weakness.  Eyes: Denies vision loss, blurry vision, and excessive redness.  ENT: Denies hearing issues and difficulty swallowing.  Cardiovascular: Denies palpitations, chest pain, or syncopal episodes.  Respiratory: Denies shortness of breath, wheezing, and coughing.  GI: Denies abdominal pain, nausea, and vomiting.   : Denies frequency, hematuria, and urgency.  Musculoskeletal: + Left third and fourth toe pain  Derm: Denies rash, open wounds, or suspicious lesions.  Neuro: Denies headaches, numbness, loss of coordination, and tremors.  Psych: Denies anxiety and depression.  Endocrine: Denies temperature intolerance and changes in appetite.  Heme:  "Denies bleeding disorders or abnormal bruising.     Objective   Ht 167.6 cm (66\")   Wt 88.5 kg (195 lb)   BMI 31.47 kg/m²     General:   Appearance: appears stated age and healthy    Orientation: AAOx3    Affect: appropriate    Gait: antalgic       VASCULAR       Left Foot Vascularity   Dorsalis pedis:  1+  Posterior tibial:  1+  Skin Temperature: warm    Edema Gradin+ and pitting  CFT:  3  Pedal Hair Growth:  Absent  Varicosities: moderate varicosities        NEUROLOGIC      Left Foot Neurologic   Light touch sensation:  Normal  Hot/cold sensation: normal    Protective Sensation using [place]ie Monofilament:  10  Achilles reflex:  2+      MUSCULOSKELETAL       Left Foot Musculoskeletal   Ecchymosis:  Toe 1 and toe 2  Tenderness: toe 1    Arch:  Normal  Hammertoe:  Third toe and fourth toe (s/p correction of the second digit which is shortened)      MUSCLE STRENGTH      Left Foot Muscle Strength   Normal strength    Foot dorsiflexion:  5  Foot plantar flexion:  5  Foot inversion:  5  Foot eversion:  5      RANGE OF MOTION       Left Foot Range of Motion   Foot and ankle ROM within normal limits        DERMATOLOGIC      Left Foot Dermatologic   Skin: skin intact        Left Foot Additional Comments: Mild discomfort with palpation to the first digit.  No pain to the second digit.  Semirigid deformities involving the third and fourth toes.  No other pain, deformity, or instability.       Assessment/Plan   Hammer toes, left foot    We will proceed with hammer digit correction of the third and fourth toes.  She understands that she will have wire sticking out of the toes after the surgery.  Decreased weightbearing activity.  Follow-up with me in 2 weeks.    Orders Placed This Encounter   Procedures   • COVID PRE-OP / PRE-PROCEDURE SCREENING ORDER (NO ISOLATION) - Swab, Nasopharynx     Standing Status:   Future     Number of Occurrences:   1     Standing Expiration Date:   2021     Order Specific " Question:   Please select your location:     Answer:    [facility]     Order Specific Question:   COVID Screening Order:     Answer:   [facility], NP SWAB IN TRANSPORT MEDIA 24-36 HR TAT [[id number]]   • Vital Signs - Per Anesthesia Protocol     Standing Status:   Standing     Number of Occurrences:   85835   • Pulse Oximetry, Continuous     Standing Status:   Standing     Number of Occurrences:   1   • Notify Anesthesiologist - Acute Changes in Patient Condition     Standing Status:   Standing     Number of Occurrences:   1   • Notify Anesthesiologist - Unrelieved Pain     Standing Status:   Standing     Number of Occurrences:   1   • Saline Lock & Maintain IV Access     Standing Status:   Standing     Number of Occurrences:   1   • Follow Anesthesia Guidelines / Standing Orders     Standing Status:   Standing     Number of Occurrences:   1   • Clip Operative Site     Shave operative site     Standing Status:   Standing     Number of Occurrences:   1   • Verify / Perform Chlorhexidine Skin Prep     Chlorhexidine Skin Wipes and Instructions For All Patients Having A Procedure Requiring an Outward Incision if Not Allergic.  If Allergic, Give Antibacterial Skin Wipes and Instructions.  Do Not Use For Facial Cases or on Any Mucus Membranes.     Standing Status:   Standing     Number of Occurrences:   1   • Verify / Perform Chlorhexidine Skin Prep if Indicated (If Not Already Completed)     Standing Status:   Standing     Number of Occurrences:   1   • Oxygen Therapy- Nasal Cannula; Titrate for SPO2: equal to or greater than, 90%     Standing Status:   Standing     Number of Occurrences:   1     Order Specific Question:   Device     Answer:   Nasal Cannula     Order Specific Question:   Titrate for SPO2     Answer:   equal to or greater than     Order Specific Question:   Titrate for SPO2     Answer:   90%     Order Specific Question:   Indications for Oxygen Therapy     Answer:   Other     Order Specific Question:    Reason or Indication     Answer:   Pre-Op   • Insert Peripheral IV     Standing Status:   Standing     Number of Occurrences:   1        Note is dictated utilizing voice recognition software. Unfortunately this leads to occasional typographical errors. I apologize in advance if the situation occurs. If questions occur please do not hesitate to call our office.

## 2020-06-15 ENCOUNTER — APPOINTMENT (OUTPATIENT)
Dept: CT IMAGING | Facility: HOSPITAL | Age: 70
End: 2020-06-15

## 2020-06-15 ENCOUNTER — APPOINTMENT (OUTPATIENT)
Dept: GENERAL RADIOLOGY | Facility: HOSPITAL | Age: 70
End: 2020-06-15

## 2020-06-15 ENCOUNTER — HOSPITAL ENCOUNTER (EMERGENCY)
Facility: HOSPITAL | Age: 70
Discharge: HOME | End: 2020-06-15
Admitting: EMERGENCY MEDICINE

## 2020-06-15 ENCOUNTER — APPOINTMENT (OUTPATIENT)
Dept: CARDIOLOGY | Facility: HOSPITAL | Age: 70
End: 2020-06-15

## 2020-06-15 VITALS
RESPIRATION RATE: 14 BRPM | SYSTOLIC BLOOD PRESSURE: 101 MMHG | WEIGHT: 200 LBS | HEIGHT: 66 IN | BODY MASS INDEX: 32.14 KG/M2 | DIASTOLIC BLOOD PRESSURE: 51 MMHG | HEART RATE: 76 BPM | TEMPERATURE: 97.5 F | OXYGEN SATURATION: 100 %

## 2020-06-15 DIAGNOSIS — R52 GENERALIZED PAIN: ICD-10-CM

## 2020-06-15 DIAGNOSIS — J18.9 PNEUMONIA OF RIGHT MIDDLE LOBE DUE TO INFECTIOUS ORGANISM: Primary | ICD-10-CM

## 2020-06-15 LAB
ALBUMIN SERPL-MCNC: 3.7 G/DL (ref 3.5–5.2)
ALBUMIN/GLOB SERPL: 1.2 G/DL
ALP SERPL-CCNC: 108 U/L (ref 39–117)
ALT SERPL W P-5'-P-CCNC: 19 U/L (ref 1–33)
ANION GAP SERPL CALCULATED.3IONS-SCNC: 10 MMOL/L (ref 5–15)
APTT PPP: 24.3 SECONDS (ref 24–31)
AST SERPL-CCNC: 19 U/L (ref 1–32)
BACTERIA UR QL AUTO: ABNORMAL /HPF
BASOPHILS # BLD AUTO: 0 10*3/MM3 (ref 0–0.2)
BASOPHILS NFR BLD AUTO: 0.1 % (ref 0–1.5)
BH CV UPPER VENOUS LEFT AXILLARY AUGMENT: NORMAL
BH CV UPPER VENOUS LEFT AXILLARY COMPETENT: NORMAL
BH CV UPPER VENOUS LEFT AXILLARY COMPRESS: NORMAL
BH CV UPPER VENOUS LEFT AXILLARY PHASIC: NORMAL
BH CV UPPER VENOUS LEFT AXILLARY SPONT: NORMAL
BH CV UPPER VENOUS LEFT BASILIC FOREARM COMPRESS: NORMAL
BH CV UPPER VENOUS LEFT BASILIC UPPER COMPRESS: NORMAL
BH CV UPPER VENOUS LEFT BRACHIAL COMPRESS: NORMAL
BH CV UPPER VENOUS LEFT CEPHALIC FOREARM COMPRESS: NORMAL
BH CV UPPER VENOUS LEFT CEPHALIC UPPER COMPRESS: NORMAL
BH CV UPPER VENOUS LEFT INTERNAL JUGULAR AUGMENT: NORMAL
BH CV UPPER VENOUS LEFT INTERNAL JUGULAR COMPETENT: NORMAL
BH CV UPPER VENOUS LEFT INTERNAL JUGULAR COMPRESS: NORMAL
BH CV UPPER VENOUS LEFT INTERNAL JUGULAR PHASIC: NORMAL
BH CV UPPER VENOUS LEFT INTERNAL JUGULAR SPONT: NORMAL
BH CV UPPER VENOUS LEFT RADIAL COMPRESS: NORMAL
BH CV UPPER VENOUS LEFT SUBCLAVIAN AUGMENT: NORMAL
BH CV UPPER VENOUS LEFT SUBCLAVIAN COMPETENT: NORMAL
BH CV UPPER VENOUS LEFT SUBCLAVIAN PHASIC: NORMAL
BH CV UPPER VENOUS LEFT SUBCLAVIAN SPONT: NORMAL
BH CV UPPER VENOUS LEFT ULNAR COMPRESS: NORMAL
BH CV UPPER VENOUS RIGHT INTERNAL JUGULAR AUGMENT: NORMAL
BH CV UPPER VENOUS RIGHT INTERNAL JUGULAR COMPETENT: NORMAL
BH CV UPPER VENOUS RIGHT INTERNAL JUGULAR COMPRESS: NORMAL
BH CV UPPER VENOUS RIGHT INTERNAL JUGULAR PHASIC: NORMAL
BH CV UPPER VENOUS RIGHT INTERNAL JUGULAR SPONT: NORMAL
BH CV UPPER VENOUS RIGHT SUBCLAVIAN AUGMENT: NORMAL
BH CV UPPER VENOUS RIGHT SUBCLAVIAN COMPETENT: NORMAL
BH CV UPPER VENOUS RIGHT SUBCLAVIAN PHASIC: NORMAL
BH CV UPPER VENOUS RIGHT SUBCLAVIAN SPONT: NORMAL
BILIRUB SERPL-MCNC: 0.7 MG/DL (ref 0.2–1.2)
BILIRUB UR QL STRIP: NEGATIVE
BUN BLD-MCNC: 15 MG/DL (ref 8–23)
BUN BLD-MCNC: ABNORMAL MG/DL
BUN/CREAT SERPL: ABNORMAL
CALCIUM SPEC-SCNC: 9.1 MG/DL (ref 8.6–10.5)
CHLORIDE SERPL-SCNC: 105 MMOL/L (ref 98–107)
CK SERPL-CCNC: 40 U/L (ref 20–180)
CLARITY UR: CLEAR
CO2 SERPL-SCNC: 22 MMOL/L (ref 22–29)
COLOR UR: ABNORMAL
CREAT BLD-MCNC: 0.84 MG/DL (ref 0.57–1)
CRP SERPL-MCNC: 8.15 MG/DL (ref 0–0.5)
D DIMER PPP FEU-MCNC: 3.46 MCGFEU/ML (ref 0.17–0.59)
DEPRECATED RDW RBC AUTO: 49 FL (ref 37–54)
EOSINOPHIL # BLD AUTO: 0 10*3/MM3 (ref 0–0.4)
EOSINOPHIL NFR BLD AUTO: 0.5 % (ref 0.3–6.2)
ERYTHROCYTE [DISTWIDTH] IN BLOOD BY AUTOMATED COUNT: 14.8 % (ref 12.3–15.4)
ERYTHROCYTE [SEDIMENTATION RATE] IN BLOOD: 27 MM/HR (ref 0–30)
GFR SERPL CREATININE-BSD FRML MDRD: 67 ML/MIN/1.73
GLOBULIN UR ELPH-MCNC: 3.1 GM/DL
GLUCOSE BLD-MCNC: 111 MG/DL (ref 65–99)
GLUCOSE UR STRIP-MCNC: NEGATIVE MG/DL
HCT VFR BLD AUTO: 37.4 % (ref 34–46.6)
HGB BLD-MCNC: 12.8 G/DL (ref 12–15.9)
HGB UR QL STRIP.AUTO: NEGATIVE
HYALINE CASTS UR QL AUTO: ABNORMAL /LPF
INR PPP: 1.06 (ref 0.9–1.1)
KETONES UR QL STRIP: NEGATIVE
LDH SERPL-CCNC: 203 U/L (ref 135–214)
LEUKOCYTE ESTERASE UR QL STRIP.AUTO: ABNORMAL
LYMPHOCYTES # BLD AUTO: 0.8 10*3/MM3 (ref 0.7–3.1)
LYMPHOCYTES NFR BLD AUTO: 9.6 % (ref 19.6–45.3)
MCH RBC QN AUTO: 32.7 PG (ref 26.6–33)
MCHC RBC AUTO-ENTMCNC: 34.2 G/DL (ref 31.5–35.7)
MCV RBC AUTO: 95.5 FL (ref 79–97)
MONOCYTES # BLD AUTO: 0.4 10*3/MM3 (ref 0.1–0.9)
MONOCYTES NFR BLD AUTO: 4.5 % (ref 5–12)
NEUTROPHILS # BLD AUTO: 6.7 10*3/MM3 (ref 1.7–7)
NEUTROPHILS NFR BLD AUTO: 85.3 % (ref 42.7–76)
NITRITE UR QL STRIP: NEGATIVE
NRBC BLD AUTO-RTO: 0 /100 WBC (ref 0–0.2)
PH UR STRIP.AUTO: 5.5 [PH] (ref 5–8)
PLATELET # BLD AUTO: 217 10*3/MM3 (ref 140–450)
PMV BLD AUTO: 8.1 FL (ref 6–12)
POTASSIUM BLD-SCNC: 3.9 MMOL/L (ref 3.5–5.2)
PROCALCITONIN SERPL-MCNC: 0.12 NG/ML (ref 0.1–0.25)
PROT SERPL-MCNC: 6.8 G/DL (ref 6–8.5)
PROT UR QL STRIP: NEGATIVE
PROTHROMBIN TIME: 11 SECONDS (ref 9.6–11.7)
RBC # BLD AUTO: 3.92 10*6/MM3 (ref 3.77–5.28)
RBC # UR: ABNORMAL /HPF
REF LAB TEST METHOD: ABNORMAL
SARS-COV-2 RNA PNL SPEC NAA+PROBE: NOT DETECTED
SODIUM BLD-SCNC: 137 MMOL/L (ref 136–145)
SP GR UR STRIP: 1.02 (ref 1–1.03)
SQUAMOUS #/AREA URNS HPF: ABNORMAL /HPF
TROPONIN T SERPL-MCNC: <0.01 NG/ML (ref 0–0.03)
UROBILINOGEN UR QL STRIP: ABNORMAL
WBC NRBC COR # BLD: 7.9 10*3/MM3 (ref 3.4–10.8)
WBC UR QL AUTO: ABNORMAL /HPF

## 2020-06-15 PROCEDURE — 25010000002 HYDROMORPHONE PER 4 MG: Performed by: NURSE PRACTITIONER

## 2020-06-15 PROCEDURE — 0 IOPAMIDOL PER 1 ML: Performed by: NURSE PRACTITIONER

## 2020-06-15 PROCEDURE — 82550 ASSAY OF CK (CPK): CPT | Performed by: NURSE PRACTITIONER

## 2020-06-15 PROCEDURE — 96374 THER/PROPH/DIAG INJ IV PUSH: CPT

## 2020-06-15 PROCEDURE — 84484 ASSAY OF TROPONIN QUANT: CPT | Performed by: NURSE PRACTITIONER

## 2020-06-15 PROCEDURE — 87635 SARS-COV-2 COVID-19 AMP PRB: CPT | Performed by: NURSE PRACTITIONER

## 2020-06-15 PROCEDURE — 93971 EXTREMITY STUDY: CPT

## 2020-06-15 PROCEDURE — 84145 PROCALCITONIN (PCT): CPT | Performed by: NURSE PRACTITIONER

## 2020-06-15 PROCEDURE — 99283 EMERGENCY DEPT VISIT LOW MDM: CPT

## 2020-06-15 PROCEDURE — 25010000002 ONDANSETRON PER 1 MG: Performed by: NURSE PRACTITIONER

## 2020-06-15 PROCEDURE — 81001 URINALYSIS AUTO W/SCOPE: CPT | Performed by: NURSE PRACTITIONER

## 2020-06-15 PROCEDURE — 83615 LACTATE (LD) (LDH) ENZYME: CPT | Performed by: NURSE PRACTITIONER

## 2020-06-15 PROCEDURE — 71275 CT ANGIOGRAPHY CHEST: CPT

## 2020-06-15 PROCEDURE — 71045 X-RAY EXAM CHEST 1 VIEW: CPT

## 2020-06-15 PROCEDURE — 85652 RBC SED RATE AUTOMATED: CPT | Performed by: NURSE PRACTITIONER

## 2020-06-15 PROCEDURE — 86140 C-REACTIVE PROTEIN: CPT | Performed by: NURSE PRACTITIONER

## 2020-06-15 PROCEDURE — 25010000002 CEFTRIAXONE PER 250 MG: Performed by: NURSE PRACTITIONER

## 2020-06-15 PROCEDURE — 85025 COMPLETE CBC W/AUTO DIFF WBC: CPT | Performed by: NURSE PRACTITIONER

## 2020-06-15 PROCEDURE — 80053 COMPREHEN METABOLIC PANEL: CPT | Performed by: NURSE PRACTITIONER

## 2020-06-15 PROCEDURE — 85379 FIBRIN DEGRADATION QUANT: CPT | Performed by: NURSE PRACTITIONER

## 2020-06-15 PROCEDURE — 85730 THROMBOPLASTIN TIME PARTIAL: CPT | Performed by: NURSE PRACTITIONER

## 2020-06-15 PROCEDURE — P9612 CATHETERIZE FOR URINE SPEC: HCPCS

## 2020-06-15 PROCEDURE — 96375 TX/PRO/DX INJ NEW DRUG ADDON: CPT

## 2020-06-15 PROCEDURE — 93005 ELECTROCARDIOGRAM TRACING: CPT | Performed by: NURSE PRACTITIONER

## 2020-06-15 PROCEDURE — 85610 PROTHROMBIN TIME: CPT | Performed by: NURSE PRACTITIONER

## 2020-06-15 PROCEDURE — 99284 EMERGENCY DEPT VISIT MOD MDM: CPT

## 2020-06-15 RX ORDER — SODIUM CHLORIDE 0.9 % (FLUSH) 0.9 %
10 SYRINGE (ML) INJECTION AS NEEDED
Status: DISCONTINUED | OUTPATIENT
Start: 2020-06-15 | End: 2020-06-15 | Stop reason: HOSPADM

## 2020-06-15 RX ORDER — CEFDINIR 300 MG/1
300 CAPSULE ORAL 2 TIMES DAILY
Qty: 14 CAPSULE | Refills: 0 | Status: SHIPPED | OUTPATIENT
Start: 2020-06-15 | End: 2020-06-22

## 2020-06-15 RX ORDER — ONDANSETRON 2 MG/ML
4 INJECTION INTRAMUSCULAR; INTRAVENOUS ONCE
Status: COMPLETED | OUTPATIENT
Start: 2020-06-15 | End: 2020-06-15

## 2020-06-15 RX ORDER — HYDROMORPHONE HCL 110MG/55ML
0.5 PATIENT CONTROLLED ANALGESIA SYRINGE INTRAVENOUS ONCE
Status: COMPLETED | OUTPATIENT
Start: 2020-06-15 | End: 2020-06-15

## 2020-06-15 RX ORDER — METHOCARBAMOL 500 MG/1
500 TABLET, FILM COATED ORAL 4 TIMES DAILY
Qty: 12 TABLET | Refills: 0 | Status: SHIPPED | OUTPATIENT
Start: 2020-06-15

## 2020-06-15 RX ORDER — DOXYCYCLINE HYCLATE 100 MG
100 TABLET ORAL 2 TIMES DAILY
Qty: 20 TABLET | Refills: 0 | Status: SHIPPED | OUTPATIENT
Start: 2020-06-15 | End: 2020-06-25

## 2020-06-15 RX ORDER — PREDNISONE 20 MG/1
20 TABLET ORAL 2 TIMES DAILY
Qty: 10 TABLET | Refills: 0 | Status: SHIPPED | OUTPATIENT
Start: 2020-06-15 | End: 2020-06-20

## 2020-06-15 RX ADMIN — IOPAMIDOL 75 ML: 755 INJECTION, SOLUTION INTRAVENOUS at 11:56

## 2020-06-15 RX ADMIN — ONDANSETRON 4 MG: 2 INJECTION INTRAMUSCULAR; INTRAVENOUS at 10:18

## 2020-06-15 RX ADMIN — CEFTRIAXONE SODIUM 1 G: 10 INJECTION, POWDER, FOR SOLUTION INTRAVENOUS at 13:37

## 2020-06-15 RX ADMIN — HYDROMORPHONE HYDROCHLORIDE 0.5 MG: 2 INJECTION, SOLUTION INTRAMUSCULAR; INTRAVENOUS; SUBCUTANEOUS at 10:18

## 2020-06-15 NOTE — ED PROVIDER NOTES
"Subjective   Patient is a 70-year-old white female with history of ankylosing spondylitis, arthritis, connective tissue disease, pulmonary fibrosis, raynauds presents today with complaints of pain all over.  She complains of pain midline neck, upper back and lower back.  She reports most of her pain is in the left side of her body but does report some pain in the right side of her body.  She reports her symptoms started 3 days ago.  She complains of a sharp pain in her chest and back when she takes a deep breath or moves a certain way.  She denies any other chest pain.  She denies any shortness of breath.  She denies any fever chills cough or congestion.  She denies any nausea vomiting or diarrhea.  She denies any dysuria frequency urgency or difficulty urinating.  She states she did have surgery on her left foot 10 days ago.  She does complain of swelling in her left arm and hand.  She states \"I cannot stand up straight because of this pain that I get in my neck, chest and back.\"          Review of Systems   Constitutional: Negative for chills and fever.   HENT: Negative for congestion and sore throat.    Respiratory: Negative for shortness of breath.    Cardiovascular: Positive for chest pain. Negative for leg swelling.   Gastrointestinal: Negative for abdominal pain, blood in stool, diarrhea, nausea and vomiting.   Genitourinary: Negative for decreased urine volume, difficulty urinating, dysuria, frequency and urgency.   Musculoskeletal: Positive for back pain, myalgias, neck pain and neck stiffness.        Left arm swelling   Skin: Negative for rash.   Neurological: Negative for dizziness, facial asymmetry, speech difficulty, weakness, light-headedness, numbness and headaches.       Past Medical History:   Diagnosis Date   • Ankylosing spondylitis (CMS/[facility])    • Arthritis    • Asthma    • Connective tissue disease (CMS/[facility])    • GERD (gastroesophageal reflux disease)    • Hypothyroidism 2/13/2020   • Pulmonary " fibrosis (CMS/HCC)    • Raynaud's disease        Allergies   Allergen Reactions   • Bee Venom Anaphylaxis   • Thien Flavor Swelling   • Adhesive Tape Other (See Comments)     Sensitive   • Flexeril [Cyclobenzaprine] Other (See Comments)     Hypotension   • Metoclopramide Other (See Comments)     Tardive diskinesia  Tardive diskinesia  tardive dyskinesia     • Morphine Hallucinations   • Penicillins Other (See Comments)     Childhood   • Sulfa Antibiotics Other (See Comments)     Childhood       Past Surgical History:   Procedure Laterality Date   • APPENDECTOMY     • BARIATRIC SURGERY  2010   • BUNIONECTOMY     • CHOLECYSTECTOMY     • HAMMER TOE REPAIR Left 6/5/2020    Procedure: HAMMER TOE REPAIR third and fourth toe;  Surgeon: TRELL Pérez DPM;  Location: [facility] MAIN OR;  Service: Podiatry;  Laterality: Left;   • KNEE SURGERY Right 2009   • SHOULDER SURGERY Bilateral 2011 and 2012       Family History   Adopted: Yes   Family history unknown: Yes       Social History     Socioeconomic History   • Marital status:      Spouse name: Not on file   • Number of children: Not on file   • Years of education: Not on file   • Highest education level: Not on file   Tobacco Use   • Smoking status: Never Smoker   • Smokeless tobacco: Never Used   Substance and Sexual Activity   • Alcohol use: Yes   • Drug use: Never   • Sexual activity: Defer           Objective   Physical Exam  Vital signs and triage nurse note reviewed.  Constitutional: Awake, alert; well-developed and well-nourished. No acute distress is noted.  HEENT: Normocephalic, atraumatic; pupils are PERRL with intact EOM; oropharynx is pink and moist without exudate or erythema.  No drooling or pooling of oral secretions.  Neck: Supple, full range of motion without pain; no cervical lymphadenopathy. Normal phonation.  Cardiovascular: Regular rate and rhythm, normal S1-S2.  No murmur noted.  Pulmonary: Respiratory effort regular nonlabored, breath sounds  clear to auscultation all fields.  Some tenderness over the left chest wall.  There is no crepitus or subcutaneous emphysema noted.  No rash.  Abdomen: Soft, nontender, nondistended with normoactive bowel sounds; no rebound or guarding.  Musculoskeletal: Independent range of motion of all extremities.  CAM Walker boot noted to the left foot lower leg.  Distal neurovascular motor intact bilaterally.  Neuro: Alert oriented x3, speech is clear and appropriate, GCS 15.    Skin: Flesh tone, warm, dry, intact; no erythematous or petechial rash or lesion.      Procedures           ED Course  ED Course as of Kiko 15 1347   Mon Kiko 15, 2020   1015 Reviewed by me and interpreted by Dr. Villar: Sinus rhythm with ventricular rate of 68.  Left anterior fascicular block.  Left ventricular hypertrophy.  No significant change from prior EKG on 6/3/2020.    [MD]      ED Course User Index  [MD] Abbey Taveras APRN      Labs Reviewed   COMPREHENSIVE METABOLIC PANEL - Abnormal; Notable for the following components:       Result Value    Glucose 111 (*)     All other components within normal limits    Narrative:     GFR Normal >60  Chronic Kidney Disease <60  Kidney Failure <15     URINALYSIS W/ CULTURE IF INDICATED - Abnormal; Notable for the following components:    Color, UA Dark Yellow (*)     Leuk Esterase, UA Trace (*)     All other components within normal limits   D-DIMER, QUANTITATIVE - Abnormal; Notable for the following components:    D-Dimer, Quantitative 3.46 (*)     All other components within normal limits    Narrative:     Reference Range  --------------------------------------------------------------------     < 0.50   Negative Predictive Value  0.50-0.59   Indeterminate    >= 0.60   Probable VTE             A very low percentage of patients with DVT may yield D-Dimer results   below the cut-off of 0.50 MCGFEU/mL.  This is known to be more   prevalent in patients with distal DVT.             Results of this test should  always be interpreted in conjunction with   the patient's medical history, clinical presentation and other   findings.  Clinical diagnosis should not be based on the result of   INNOVANCE D-Dimer alone.   C-REACTIVE PROTEIN - Abnormal; Notable for the following components:    C-Reactive Protein 8.15 (*)     All other components within normal limits   CBC WITH AUTO DIFFERENTIAL - Abnormal; Notable for the following components:    Neutrophil % 85.3 (*)     Lymphocyte % 9.6 (*)     Monocyte % 4.5 (*)     All other components within normal limits   URINALYSIS, MICROSCOPIC ONLY - Abnormal; Notable for the following components:    WBC, UA 3-5 (*)     Squamous Epithelial Cells, UA 3-6 (*)     All other components within normal limits   COVID-19,ABBOTT IN-HOUSE,NP SWAB (NO TRANSPORT MEDIA) 2 HR TAT - Normal   PROTIME-INR - Normal   APTT - Normal   TROPONIN (IN-HOUSE) - Normal    Narrative:     Troponin T Reference Range:  <= 0.03 ng/mL-   Negative for AMI  >0.03 ng/mL-     Abnormal for myocardial necrosis.  Clinicians would have to utilize clinical acumen, EKG, Troponin and serial changes to determine if it is an Acute Myocardial Infarction or myocardial injury due to an underlying chronic condition.       Results may be falsely decreased if patient taking Biotin.     SEDIMENTATION RATE - Normal   CK - Normal   BUN - Normal   LACTATE DEHYDROGENASE - Normal   PROCALCITONIN - Normal    Narrative:     As a Marker for Sepsis (Non-Neonates):   1. <0.5 ng/mL represents a low risk of severe sepsis and/or septic shock.  1. >2 ng/mL represents a high risk of severe sepsis and/or septic shock.    As a Marker for Lower Respiratory Tract Infections that require antibiotic therapy:  PCT on Admission     Antibiotic Therapy             6-12 Hrs later  > 0.5                Strongly Recommended            >0.25 - <0.5         Recommended  0.1 - 0.25           Discouraged                   Remeasure/reassess PCT  <0.1                  "Strongly Discouraged          Remeasure/reassess PCT      As 28 day mortality risk marker: \"Change in Procalcitonin Result\" (> 80 % or <=80 %) if Day 0 (or Day 1) and Day 4 values are available. Refer to http://[email or web address]-pct-[email or web address]/   Change in PCT <=80 %   A decrease of PCT levels below or equal to 80 % defines a positive change in PCT test result representing a higher risk for 28-day all-cause mortality of patients diagnosed with severe sepsis or septic shock.  Change in PCT > 80 %   A decrease of PCT levels of more than 80 % defines a negative change in PCT result representing a lower risk for 28-day all-cause mortality of patients diagnosed with severe sepsis or septic shock.                Results may be falsely decreased if patient taking Biotin.    CBC AND DIFFERENTIAL    Narrative:     The following orders were created for panel order CBC & Differential.  Procedure                               Abnormality         Status                     ---------                               -----------         ------                     CBC Auto Differential[[id number]]        Abnormal            Final result                 Please view results for these tests on the individual orders.     Xr Chest 1 View    Result Date: 6/15/2020  1. Stable retrocardiac density most commonly associated with hiatal hernia. 2. No acute cardiopulmonary abnormality.  Electronically Signed By-Jorge Murphy On:6/15/2020 10:25 AM This report was finalized on [id number] by  Jorge Murphy, .    Ct Chest Pulmonary Embolism    Result Date: 6/15/2020   1. No pulmonary embolism. 2. Airspace disease in the right middle lobe. Correlate clinically for pneumonia. Bronchiectasis and bronchiolectasis is seen within the sensitivity, and this may be a chronic process for this patient. 3. Geographic ground glass densities in both lungs may reflect changes of scattered atelectasis or nonspecific pneumonitis. 4. Moderate hiatal hernia with " air-fluid distention throughout the thoracic esophagus. 5. Gastric band device in place. 6. Hepatic steatosis.    Electronically Signed By-Dr. Otilia Sexton MD On:6/15/2020 12:03 PM This report was finalized on [id number] by Dr. Otilia Sexton MD.    Medications   sodium chloride 0.9 % flush 10 mL (has no administration in time range)   ondansetron (ZOFRAN) injection 4 mg (4 mg Intravenous Given 6/15/20 1018)   HYDROmorphone (DILAUDID) injection 0.5 mg (0.5 mg Intravenous Given 6/15/20 1018)   iopamidol (ISOVUE-370) 76 % injection 75 mL (75 mL Intravenous Given 6/15/20 1156)   cefTRIAXone (ROCEPHIN) in SWFI 1 gram/10ml IV PUSH syringe (1 g Intravenous Given 6/15/20 1337)                                            MDM  Number of Diagnoses or Management Options  Generalized pain:   Pneumonia of right middle lobe due to infectious organism:   Diagnosis management comments: Comorbidities: Ankylosing spondylitis, arthritis, connective tissue disease, pulmonary fibrosis, raynauds  Differentials: Electrolyte abnormality, rhabdomyolysis, pleurisy, pneumonia, pneumothorax, PE, DVT, SVT, polymyalgia rheumatica;this list is not all inclusive and does not constitute the entirety of considered causes  Discussion with provider:  Radiology interpretation: X-rays reviewed by me and interpreted by radiologist: As above  Lab interpretation: Labs viewed by me significant for: As above    Patient placed on continuous cardiac monitor.  She had IV established.  She had labs, EKG chest x-ray obtained.  She had a venous Doppler obtained of the left arm was found to be negative for DVT or SVT per vascular tech prelim results.  D-dimer was slightly elevated so patient had CT PE protocol obtained.  She does report history of clots in the past and has an IVC filter.  She was given Dilaudid and Zofran for pain.    On reexamination, patient is resting comfortably no acute distress.  She denies any new complaints at this time.  She states  she is feeling some better.  She remains well-appearing and in no acute distress with stable vital signs.  She was given Rocephin 1 g IV x1 dose in the ED.    Diagnosis and treatment plan discussed with patient.  Patient agreeable to plan.   I discussed findings with patient who voices understanding of discharge instructions, signs and symptoms requiring return to ED; discharged improved and in stable condition with follow up for re-evaluation.  Prescriptions for prednisone, Robaxin, doxycycline, Omnicef.       Amount and/or Complexity of Data Reviewed  Clinical lab tests: ordered and reviewed  Tests in the radiology section of CPT®: ordered and reviewed  Tests in the medicine section of CPT®: reviewed    Patient Progress  Patient progress: stable      Final diagnoses:   Pneumonia of right middle lobe due to infectious organism   Generalized pain            Abbey Taveras APRN  06/15/20 1200       Abbey Taveras APRN  06/15/20 1345

## 2020-06-15 NOTE — DISCHARGE INSTRUCTIONS
Take medication as prescribed.  Drink plenty of fluids.  Close follow-up with your primary care physician this week for recheck.  Return for new or worsening symptoms.

## 2020-06-16 ENCOUNTER — PATIENT OUTREACH (OUTPATIENT)
Dept: CASE MANAGEMENT | Facility: OTHER | Age: 70
End: 2020-06-16

## 2020-06-16 ENCOUNTER — EPISODE CHANGES (OUTPATIENT)
Dept: CASE MANAGEMENT | Facility: OTHER | Age: 70
End: 2020-06-16

## 2020-06-16 NOTE — OUTREACH NOTE
RN-KRISTIAN outreach call made to pt, who was discharged [facility] ED on 6/15/20 w/ DX PNA.  COVID 19 testing performed, NEG result recorded.  Pt states has been notified re neg test results. States this is her 2nd negative covid-19 test result.    Pt reports sx are very much improved today. Pt confirmed she started all ED RXs. Denies new sx.  Patient states has support from family/friends if needed.    Reviewed ED DC recommendations and ED AVS with pt - pt verb understanding. Denies food, medication or transportation insecurities.     Discussed importance of close PCP f/u. Offered to assist pt with scheduling a externalPCP appointment.  Pt declined scheduling assistance, but verb appreciation for the offer.  States she has podiatry surgery f/u appt 6/18/20.  Patient states she is an active  with [employer].  She declined need for any telephone resource numbers or covid information. Unable to further outreach, patient thanks RN-ACM for calling and ended call. No future outreach scheduled at this time.

## 2020-06-16 NOTE — OUTREACH NOTE
Care Coordination Note    Called [facility] help #, LVM.    Fay Butler RN  Ambulatory     6/16/2020, 16:54

## 2020-06-16 NOTE — OUTREACH NOTE
Patient Outreach Note  Called patient back, confirmed that Dr Summers must release the surgical report in Epic before it will transfer to [facility].    Patient verb satfisfaction with this answer - states she has appt w/ Dr Summers Thursday and can ask him to release the report then.  She verb appreciation for the clarification.  Pt voices no other questions or concerns at this time.    Fay Butler RN  Ambulatory     6/16/2020, 16:55

## 2020-06-16 NOTE — OUTREACH NOTE
Care Coordination Note  Called [facility] medical records LVM.  Fay Butler RN  Ambulatory     6/16/2020, 16:53

## 2020-06-16 NOTE — OUTREACH NOTE
Care Coordination Note    Called office BEHZAD Beck left msg with jason that pt's covid-19 test was negative. She verb appreciation for the call.    Fay Butler RN  Ambulatory     6/16/2020, 17:04

## 2020-06-16 NOTE — OUTREACH NOTE
Care Coordination Note  Called  MyChart Help # - was able to clarify pt's question:  Alida confirmed the surgeon must release the report in Epic before it will appear in [employer].  Fay Butler RN  Ambulatory     6/16/2020, 16:54

## 2020-06-18 ENCOUNTER — OFFICE VISIT (OUTPATIENT)
Dept: PODIATRY | Facility: CLINIC | Age: 70
End: 2020-06-18

## 2020-06-18 VITALS
HEIGHT: 66 IN | SYSTOLIC BLOOD PRESSURE: 149 MMHG | BODY MASS INDEX: 32.14 KG/M2 | HEART RATE: 79 BPM | DIASTOLIC BLOOD PRESSURE: 88 MMHG | WEIGHT: 200 LBS

## 2020-06-18 DIAGNOSIS — M20.42 HAMMER TOE OF LEFT FOOT: Primary | ICD-10-CM

## 2020-06-18 PROCEDURE — 99024 POSTOP FOLLOW-UP VISIT: CPT | Performed by: PODIATRIST

## 2020-06-18 RX ORDER — DOXYCYCLINE HYCLATE 100 MG/1
CAPSULE ORAL
COMMUNITY
Start: 2020-06-15

## 2020-06-18 NOTE — PROGRESS NOTES
06/18/2020  Foot and Ankle Surgery - Established Patient/Follow-up  Provider: Dr. Steve Pérez, VISHAL  Location: [facility]    Subjective:  Jo Prescott is a 70 y.o. female.     Chief Complaint   Patient presents with   • Left Foot - Follow-up, Post-op       HPI: Patient returns after hammertoe correction to the left foot.  She is doing quite well.  She denies any significant pain.  She has remained mostly off weightbearing.    Allergies   Allergen Reactions   • Bee Venom Anaphylaxis   • [place] Flavor Swelling   • Adhesive Tape Other (See Comments)     Sensitive   • Flexeril [Cyclobenzaprine] Other (See Comments)     Hypotension   • Metoclopramide Other (See Comments)     Tardive diskinesia  Tardive diskinesia  tardive dyskinesia     • Morphine Hallucinations   • Penicillins Other (See Comments)     Childhood   • Sulfa Antibiotics Other (See Comments)     Childhood       Current Outpatient Medications on File Prior to Visit   Medication Sig Dispense Refill   • alendronate (FOSAMAX) 70 MG tablet Take 70 mg by mouth Every 7 (Seven) Days. tues     • ALPRAZolam (XANAX) 1 MG tablet Take 1 mg by mouth As Needed.     • BREO ELLIPTA 100-25 MCG/INH inhaler Inhale 1 puff Daily.     • cefdinir (OMNICEF) 300 MG capsule Take 1 capsule by mouth 2 (Two) Times a Day for 7 days. 14 capsule 0   • celecoxib (CeleBREX) 200 MG capsule Take 200 mg by mouth Daily. Hold 7 days prior to surgery     • Cholecalciferol (VITAMIN D3) 1.25 MG (07259 UT) capsule Take 50,000 Units by mouth 3 (Three) Times a Week.     • cyanocobalamin 1000 MCG/ML injection Inject 1,000 mcg into the appropriate muscle as directed by prescriber Every 14 (Fourteen) Days.     • doxycycline (VIBRAMYCIN) 100 MG capsule      • doxycycline (VIBRAMYICN) 100 MG tablet Take 1 tablet by mouth 2 (Two) Times a Day for 10 days. 20 tablet 0   • DULoxetine (CYMBALTA) 60 MG capsule Take 60 mg by mouth Daily.     • furosemide (LASIX) 80 MG tablet Take 80 mg by mouth  "Daily. Hold DOS     • HYDROcodone-acetaminophen (NORCO) 7.5-325 MG per tablet Take 1 tablet by mouth Every 6 (Six) Hours As Needed for Moderate Pain  (Pain). 28 tablet 0   • levothyroxine (SYNTHROID, LEVOTHROID) 112 MCG tablet Take 112 mcg by mouth Daily.     • methocarbamol (ROBAXIN) 500 MG tablet Take 1 tablet by mouth 4 (Four) Times a Day. 12 tablet 0   • NIFEdipine XL (PROCARDIA XL) 30 MG 24 hr tablet Take 30 mg by mouth Daily. 6 months out of the year Oct through April     • omeprazole (priLOSEC) 20 MG capsule Take 20 mg by mouth Daily.     • potassium chloride (K-DUR,KLOR-CON) 20 MEQ CR tablet TAKE 1 TABLET BY MOUTH TWICE DAILY     • predniSONE (DELTASONE) 20 MG tablet Take 1 tablet by mouth 2 (Two) Times a Day for 5 days. 10 tablet 0   • PROAIR RESPICLICK 108 (90 Base) MCG/ACT inhaler Inhale 2 puffs Every 4 (Four) Hours As Needed for Shortness of Air.     • rOPINIRole (REQUIP) 1 MG tablet Take 2 mg by mouth Every Night.       No current facility-administered medications on file prior to visit.        Objective   /88   Pulse 79   Ht 167.6 cm (66\")   Wt 90.7 kg (200 lb)   BMI 32.28 kg/m²     General:   Appearance: appears stated age and healthy    Orientation: AAOx3    Affect: appropriate    Gait: antalgic       VASCULAR       Left Foot Vascularity   Dorsalis pedis:  1+  Posterior tibial:  1+  Skin Temperature: warm    Edema Gradin+ and pitting  CFT:  3  Pedal Hair Growth:  Absent  Varicosities: moderate varicosities        NEUROLOGIC      Left Foot Neurologic   Light touch sensation:  Normal  Hot/cold sensation: normal    Protective Sensation using [place] Monofilament:  10  Achilles reflex:  2+      MUSCULOSKELETAL       Left Foot Musculoskeletal   Ecchymosis: Resolved  Tenderness: toe 1    Arch:  Normal  Hammertoe: Rectus alignment of the digits      MUSCLE STRENGTH      Left Foot Muscle Strength   Normal strength    Foot dorsiflexion:  5  Foot plantar flexion:  5  Foot " inversion:  5  Foot eversion:  5      RANGE OF MOTION       Left Foot Range of Motion   Foot and ankle ROM within normal limits        DERMATOLOGIC      Left Foot Dermatologic    Left Foot Additional Comments: Pin sites are clean and dry.  Incision sites are well coapted with intact sutures.  No evidence of dehiscence or infection    Assessment/Plan   Jo was seen today for follow-up and post-op.    Diagnoses and all orders for this visit:    Hammer toe of left foot      Patient appears to be doing quite well.  Sutures were removed today without complication.  The pins will remain in place at this time.  I have asked that she remain in the cam boot with weightbearing as needed.  I would like to see her in 2 weeks for reevaluation, pin removal, and imaging.    No orders of the defined types were placed in this encounter.         Note is dictated utilizing voice recognition software. Unfortunately this leads to occasional typographical errors. I apologize in advance if the situation occurs. If questions occur please do not hesitate to call our office.

## 2020-07-02 ENCOUNTER — OFFICE VISIT (OUTPATIENT)
Dept: PODIATRY | Facility: CLINIC | Age: 70
End: 2020-07-02

## 2020-07-02 VITALS
WEIGHT: 208 LBS | TEMPERATURE: 97.5 F | BODY MASS INDEX: 33.43 KG/M2 | DIASTOLIC BLOOD PRESSURE: 74 MMHG | HEIGHT: 66 IN | RESPIRATION RATE: 16 BRPM | SYSTOLIC BLOOD PRESSURE: 160 MMHG | OXYGEN SATURATION: 97 % | HEART RATE: 52 BPM

## 2020-07-02 DIAGNOSIS — M20.42 HAMMER TOE OF LEFT FOOT: Primary | ICD-10-CM

## 2020-07-02 PROCEDURE — 99024 POSTOP FOLLOW-UP VISIT: CPT | Performed by: PODIATRIST

## 2020-07-02 RX ORDER — BIMATOPROST 3 UG/ML
SOLUTION TOPICAL
COMMUNITY
Start: 2020-06-25

## 2020-07-02 NOTE — PROGRESS NOTES
07/02/2020  Foot and Ankle Surgery - Established Patient/Follow-up  Provider: Dr. Steve Pérez DPM  Location: [facility]    Subjective:  Jo Prescott is a 70 y.o. female.     Chief Complaint   Patient presents with   • Left Foot - Follow-up     Hammer toe of left foot       HPI: Patient returns for follow-up after hammer digit correction.  She states that she has been doing well but would like to discontinue use of the cam boot as it is causing her to fall and stumble.  She denies any other issues.    Allergies   Allergen Reactions   • Bee Venom Anaphylaxis   • Thien Flavor Swelling   • Adhesive Tape Other (See Comments)     Sensitive   • Flexeril [Cyclobenzaprine] Other (See Comments)     Hypotension   • Metoclopramide Other (See Comments)     Tardive diskinesia  Tardive diskinesia  tardive dyskinesia     • Morphine Hallucinations   • Penicillins Other (See Comments)     Childhood   • Sulfa Antibiotics Other (See Comments)     Childhood       Current Outpatient Medications on File Prior to Visit   Medication Sig Dispense Refill   • alendronate (FOSAMAX) 70 MG tablet Take 70 mg by mouth Every 7 (Seven) Days. tues     • ALPRAZolam (XANAX) 1 MG tablet Take 1 mg by mouth As Needed.     • bimatoprost (LATISSE) 0.03 % ophthalmic solution PLACE 1 DROP ON APPLICATOR AND APPLY EVENLY ALONG THE SKIN OF THE UPPER EYELID AT BASE OF EYELASHES AT BEDTIME; REPEAT FOR THE SECOND EYE.     • BREO ELLIPTA 100-25 MCG/INH inhaler Inhale 1 puff Daily.     • celecoxib (CeleBREX) 200 MG capsule Take 200 mg by mouth Daily. Hold 7 days prior to surgery     • Cholecalciferol (VITAMIN D3) 1.25 MG (55172 UT) capsule Take 50,000 Units by mouth 3 (Three) Times a Week.     • cyanocobalamin 1000 MCG/ML injection Inject 1,000 mcg into the appropriate muscle as directed by prescriber Every 14 (Fourteen) Days.     • DULoxetine (CYMBALTA) 60 MG capsule Take 60 mg by mouth Daily.     • furosemide (LASIX) 80 MG tablet Take 80 mg by  "mouth Daily. Hold DOS     • levothyroxine (SYNTHROID, LEVOTHROID) 112 MCG tablet Take 112 mcg by mouth Daily.     • NIFEdipine XL (PROCARDIA XL) 30 MG 24 hr tablet Take 30 mg by mouth Daily. 6 months out of the year Oct through April     • omeprazole (priLOSEC) 20 MG capsule Take 20 mg by mouth Daily.     • potassium chloride (K-DUR,KLOR-CON) 20 MEQ CR tablet TAKE 1 TABLET BY MOUTH TWICE DAILY     • PROAIR RESPICLICK 108 (90 Base) MCG/ACT inhaler Inhale 2 puffs Every 4 (Four) Hours As Needed for Shortness of Air.     • rOPINIRole (REQUIP) 1 MG tablet Take 2 mg by mouth Every Night.     • doxycycline (VIBRAMYCIN) 100 MG capsule      • HYDROcodone-acetaminophen (NORCO) 7.5-325 MG per tablet Take 1 tablet by mouth Every 6 (Six) Hours As Needed for Moderate Pain  (Pain). 28 tablet 0   • methocarbamol (ROBAXIN) 500 MG tablet Take 1 tablet by mouth 4 (Four) Times a Day. 12 tablet 0     No current facility-administered medications on file prior to visit.        Objective   /74 (BP Location: Left arm, Patient Position: Sitting)   Pulse 52   Temp 97.5 °F (36.4 °C)   Resp 16   Ht 167.6 cm (66\")   Wt 94.3 kg (208 lb)   SpO2 97%   BMI 33.57 kg/m²     General:   Appearance: appears stated age and healthy    Orientation: AAOx3    Affect: appropriate    Gait: antalgic       VASCULAR       Left Foot Vascularity   Dorsalis pedis:  1+  Posterior tibial:  1+  Skin Temperature: warm    Edema Gradin+ and pitting  CFT:  3  Pedal Hair Growth:  Absent  Varicosities: moderate varicosities        NEUROLOGIC      Left Foot Neurologic   Light touch sensation:  Normal  Hot/cold sensation: normal    Protective Sensation using Wentworth-Melodie Monofilament:  10  Achilles reflex:  2+      MUSCULOSKELETAL       Left Foot Musculoskeletal   Ecchymosis: Resolved  Tenderness: toe 1    Arch:  Normal  Hammertoe: Rectus alignment of the digits      MUSCLE STRENGTH      Left Foot Muscle Strength   Normal strength    Foot " dorsiflexion:  5  Foot plantar flexion:  5  Foot inversion:  5  Foot eversion:  5      RANGE OF MOTION       Left Foot Range of Motion   Foot and ankle ROM within normal limits        DERMATOLOGIC      Left Foot Dermatologic    Left Foot Additional Comments: Pin sites are clean and dry.  Incisions are well-healed.    Assessment/Plan   Jo was seen today for follow-up.    Diagnoses and all orders for this visit:    Hammer toe of left foot  -     XR Foot 3+ View Left      Patient returns 4 weeks after surgery.  Pins were removed today and imaging was performed showing stable alignment of the digits without any obvious signs of fusion across the interphalangeal joints.  Given that the digits remained in rectus alignment, I have asked that she proceed with flat postop shoe ambulation.  She is to avoid high-impact and excessive activity.  I would like her to gradually return to baseline activity I will see her in 4 weeks for reevaluation and imaging.    Orders Placed This Encounter   Procedures   • XR Foot 3+ View Left     Weight Bearing     Order Specific Question:   Reason for Exam:     Answer:   Left foot pos top F/U 4 weeks RM 14 WB          Note is dictated utilizing voice recognition software. Unfortunately this leads to occasional typographical errors. I apologize in advance if the situation occurs. If questions occur please do not hesitate to call our office.

## 2021-04-01 ENCOUNTER — APPOINTMENT (OUTPATIENT)
Dept: CT IMAGING | Facility: HOSPITAL | Age: 71
End: 2021-04-01

## 2021-04-01 ENCOUNTER — HOSPITAL ENCOUNTER (EMERGENCY)
Facility: HOSPITAL | Age: 71
Discharge: HOME | End: 2021-04-01
Admitting: EMERGENCY MEDICINE

## 2021-04-01 ENCOUNTER — APPOINTMENT (OUTPATIENT)
Dept: GENERAL RADIOLOGY | Facility: HOSPITAL | Age: 71
End: 2021-04-01

## 2021-04-01 VITALS
HEIGHT: 66 IN | DIASTOLIC BLOOD PRESSURE: 53 MMHG | HEART RATE: 81 BPM | RESPIRATION RATE: 18 BRPM | TEMPERATURE: 98 F | BODY MASS INDEX: 31.82 KG/M2 | WEIGHT: 198 LBS | SYSTOLIC BLOOD PRESSURE: 119 MMHG | OXYGEN SATURATION: 99 %

## 2021-04-01 DIAGNOSIS — M54.2 NECK PAIN: ICD-10-CM

## 2021-04-01 DIAGNOSIS — S40.012A CONTUSION OF LEFT SHOULDER, INITIAL ENCOUNTER: ICD-10-CM

## 2021-04-01 DIAGNOSIS — S80.12XA CONTUSION OF LEFT LOWER EXTREMITY, INITIAL ENCOUNTER: ICD-10-CM

## 2021-04-01 DIAGNOSIS — S09.90XA INJURY OF HEAD, INITIAL ENCOUNTER: ICD-10-CM

## 2021-04-01 DIAGNOSIS — S62.652A CLOSED NONDISPLACED FRACTURE OF MIDDLE PHALANX OF RIGHT MIDDLE FINGER, INITIAL ENCOUNTER: ICD-10-CM

## 2021-04-01 DIAGNOSIS — M79.644 PAIN OF FINGER OF RIGHT HAND: ICD-10-CM

## 2021-04-01 DIAGNOSIS — R07.81 RIB PAIN ON LEFT SIDE: ICD-10-CM
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
## 2021-04-01 DIAGNOSIS — M25.512 ACUTE PAIN OF LEFT SHOULDER: ICD-10-CM

## 2021-04-01 DIAGNOSIS — S16.1XXA STRAIN OF NECK MUSCLE, INITIAL ENCOUNTER: ICD-10-CM

## 2021-04-01 DIAGNOSIS — M79.605 PAIN OF LEFT LOWER EXTREMITY: ICD-10-CM

## 2021-04-01 PROCEDURE — 73590 X-RAY EXAM OF LOWER LEG: CPT

## 2021-04-01 PROCEDURE — 73130 X-RAY EXAM OF HAND: CPT

## 2021-04-01 PROCEDURE — 71101 X-RAY EXAM UNILAT RIBS/CHEST: CPT

## 2021-04-01 PROCEDURE — 72125 CT NECK SPINE W/O DYE: CPT

## 2021-04-01 PROCEDURE — 73030 X-RAY EXAM OF SHOULDER: CPT

## 2021-04-01 PROCEDURE — 99283 EMERGENCY DEPT VISIT LOW MDM: CPT

## 2021-04-01 PROCEDURE — 70450 CT HEAD/BRAIN W/O DYE: CPT

## 2021-04-01 RX ORDER — HYDROCODONE BITARTRATE AND ACETAMINOPHEN 5; 325 MG/1; MG/1
1 TABLET ORAL EVERY 6 HOURS PRN
Qty: 12 TABLET | Refills: 0 | Status: SHIPPED | OUTPATIENT
Start: 2021-04-01

## 2021-04-01 NOTE — DISCHARGE INSTRUCTIONS
Take Norco as needed for pain.  Do not operate heavy machinery or drink alcohol while taking this medication.    Follow-up with the care center as listed below for further evaluation and management of your finger fracture.  Wear wrist splint unless otherwise specified by orthopedist.    Follow-up with your primary care provider in 3 to 5 days    Keep head elevated, apply cool compresses as tolerated; activity as tolerated, avoid large meals or excessive fluid intake for the next 12 hours; follow up with your primary care physician in office for continued evaluation; return to ED for any new concerns or changes including behavioral cognition changes, imbalance, vomiting, any increased swelling, inability of eyes to focus or other changes.

## 2021-04-01 NOTE — ED PROVIDER NOTES
Subjective   Patient is a 71-year-old female who presents after a fall about 2 hours prior to arrival to the ED.  Patient states that she tripped over a cord and one of her cats and dogs.  Patient states she hit her head and left side of her body on her wooden bed frame.  She does not believe she had LOC at the time of the incident.  Patient states since then she has had left-sided head, neck, shoulder pain she reports some associated nausea and lightheadedness.  She denies any one-sided numbness weakness slurred speech or facial droop patient states she is not on any blood thinners.  Pain is worse with certain movements better with rest.  She also reports her right middle finger pain she thinks he tried to catch her self with her hand.  Has any significant swelling states is worse with certain movements.  She also denies left lower extremity pain just distal to her knee.  She does report some bruising to the area.  Reports some numbness in her toes which is somewhat chronic for her.  She denies any chest pain reports chronic shortness of breath denies significant change.  Abdominal pain diarrhea or hematuria.  She denies any other alleviating or exacerbating factors of her pain.  Rates her overall pain as moderate in severity          Review of Systems   Constitutional: Negative.    Eyes: Positive for photophobia and visual disturbance. Negative for pain, discharge, redness and itching.   Respiratory: Negative.    Cardiovascular: Negative.    Gastrointestinal: Positive for nausea. Negative for abdominal distention, abdominal pain, constipation, diarrhea and vomiting.   Genitourinary: Negative.    Musculoskeletal: Positive for arthralgias, myalgias and neck pain. Negative for back pain, joint swelling and neck stiffness.   Skin: Positive for wound. Negative for color change, pallor and rash.   Neurological: Positive for light-headedness and headaches. Negative for dizziness, tremors, seizures, syncope, facial  asymmetry, speech difficulty, weakness and numbness.       Past Medical History:   Diagnosis Date   • Ankylosing spondylitis (CMS/[facility])    • Arthritis    • Asthma    • Connective tissue disease (CMS/[facility]ly [facility])    • GERD (gastroesophageal reflux disease)    • Hypothyroidism 2/13/2020   • Pulmonary fibrosis (CMS/[facility])    • Raynaud's disease        Allergies   Allergen Reactions   • Bee Venom Anaphylaxis   • Thien Flavor Swelling   • Adhesive Tape Other (See Comments)     Sensitive   • Flexeril [Cyclobenzaprine] Other (See Comments)     Hypotension   • Metoclopramide Other (See Comments)     Tardive diskinesia  Tardive diskinesia  tardive dyskinesia     • Morphine Hallucinations   • Penicillins Other (See Comments)     Childhood   • Sulfa Antibiotics Other (See Comments)     Childhood       Past Surgical History:   Procedure Laterality Date   • APPENDECTOMY     • BARIATRIC SURGERY  2010   • BUNIONECTOMY     • CHOLECYSTECTOMY     • HAMMER TOE REPAIR Left 6/5/2020    Procedure: HAMMER TOE REPAIR third and fourth toe;  Surgeon: TRELL Pérez DPM;  Location: [facility];  Service: Podiatry;  Laterality: Left;   • KNEE SURGERY Right 2009   • SHOULDER SURGERY Bilateral 2011 and 2012       Family History   Adopted: Yes   Family history unknown: Yes       Social History     Socioeconomic History   • Marital status:      Spouse name: Not on file   • Number of children: Not on file   • Years of education: Not on file   • Highest education level: Not on file   Tobacco Use   • Smoking status: Never Smoker   • Smokeless tobacco: Never Used   Substance and Sexual Activity   • Alcohol use: Yes   • Drug use: Never   • Sexual activity: Defer           Objective   Physical Exam  Vitals and nursing note reviewed.   Constitutional:       General: She is not in acute distress.     Appearance: She is well-developed. She is not ill-appearing, toxic-appearing or diaphoretic.   HENT:      Head: Normocephalic and atraumatic.       Mouth/Throat:      Mouth: Mucous membranes are moist.      Pharynx: Oropharynx is clear.   Eyes:      General: No scleral icterus.     Extraocular Movements: Extraocular movements intact.      Pupils: Pupils are equal, round, and reactive to light.   Neck:      Trachea: Trachea and phonation normal.   Cardiovascular:      Rate and Rhythm: Normal rate and regular rhythm.      Pulses: Normal pulses.      Heart sounds: Normal heart sounds. No murmur heard.   No friction rub. No gallop.    Pulmonary:      Effort: Pulmonary effort is normal. No respiratory distress.      Breath sounds: Normal breath sounds. No stridor. No wheezing, rhonchi or rales.   Chest:      Chest wall: No tenderness.   Abdominal:      General: Bowel sounds are normal. There is no distension. There are no signs of injury.      Palpations: Abdomen is soft. There is no fluid wave, hepatomegaly, splenomegaly or mass.      Tenderness: There is no abdominal tenderness. There is no right CVA tenderness, left CVA tenderness, guarding or rebound.      Hernia: No hernia is present.   Musculoskeletal:      Right shoulder: Normal.      Left shoulder: Tenderness present. No swelling, deformity, laceration or crepitus. Decreased range of motion. Normal strength. Normal pulse.      Right upper arm: Normal.      Left upper arm: Normal.      Right elbow: Normal.      Left elbow: Normal.      Right forearm: Normal.      Left forearm: Normal.      Right wrist: Normal.      Left wrist: Normal.      Right hand: Bony tenderness present. Normal strength. Normal sensation. There is no disruption of two-point discrimination. Normal capillary refill. Normal pulse.      Left hand: Normal.      Cervical back: No edema, erythema or rigidity. Muscular tenderness present. No spinous process tenderness. Decreased range of motion.      Thoracic back: Normal.      Lumbar back: Normal.      Right hip: Normal.      Left hip: Normal.      Right upper leg: Normal.      Left upper leg:  "Normal.      Right knee: Normal.      Left knee: Normal.      Right lower leg: Normal.      Left lower leg: Tenderness present.        Legs:       Comments: Decreased range of motion of the right third finger along the DIP and PIP joints.  Arthritic changes noted of bilateral hands.  No cellulitic changes noted.  No surrounding swelling or ecchymosis.    Left shoulder: There is no erythema induration or warmth noted.  The patient has increased range of motion about the shoulder with negative drop-arm test, negative impingement sign.       The patient has intact radial medial ulnar and axillary nerves of upper extremities bilaterally.    Normal range of motion of the hips knees and ankles bilaterally with no overlying erythema edema or warmth noted.  Peripheral pulses are intact compartments are soft bilateral lower extremities.     Skin:     General: Skin is warm.      Capillary Refill: Capillary refill takes less than 2 seconds.      Coloration: Skin is not cyanotic, jaundiced or pale.      Findings: No rash.   Neurological:      General: No focal deficit present.      Mental Status: She is alert and oriented to person, place, and time.   Psychiatric:         Mood and Affect: Mood normal.         Behavior: Behavior normal.         Procedures           ED Course    /53   Pulse 81   Temp 98 °F (36.7 °C) (Oral)   Resp 18   Ht 167.6 cm (66\")   Wt 89.8 kg (198 lb)   SpO2 99%   Breastfeeding No   BMI 31.96 kg/m²   Medications - No data to display    Labs Reviewed - No data to display  XR Ribs Left With PA Chest    Result Date: 4/1/2021  1. No acute cardiopulmonary process. 2. Negative for displaced left-sided rib fracture. 3. Chronic findings above.  Electronically Signed By-Dmitriy Castle MD On:4/1/2021 12:42 PM This report was finalized on [id number] by  Dmitriy Castle MD.    XR Shoulder 2+ View Left    Result Date: 4/1/2021   1. Negative for fracture. 2. Left reverse shoulder prosthesis again noted.  " Electronically Signed By-Dmitriy Castle MD On:4/1/2021 1:50 PM This report was finalized on [id number] by  Dmitriy Castle MD.    XR Hand 3+ View Right    Result Date: 4/1/2021  1. Fracture at the base of the third middle phalanx abutting the PIP joint. 2. Osteopenia and additional chronic findings above.  Electronically Signed By-Dmitriy Castle MD On:4/1/2021 12:38 PM This report was finalized on [id number] by  Dmitriy Catsle MD.    XR Tibia Fibula 2 View Left    Result Date: 4/1/2021   1. No acute fracture dislocation. 2. Mild degenerative changes of the left knee.  Electronically Signed By-Alexandru Guajardo MD On:4/1/2021 12:40 PM This report was finalized on [id number] by  Alexandru Guajardo MD.    CT Head Without Contrast    Result Date: 4/1/2021  1. No intracranial hemorrhage. 2. Chronic findings above.  Electronically Signed By-Dmitriy Castle MD On:4/1/2021 1:10 PM This report was finalized on [id number] by  Dmitriy Castle MD.    CT Cervical Spine Without Contrast    Result Date: 4/1/2021  1. Negative for cervical spine fracture. 2. Multilevel degenerative findings above. 3. Dilated upper esophagus filled with fluid/debris similar to prior chest CT which may relate to delayed esophageal clearance and known hiatal hernia status post lap band placement.  Electronically Signed By-Dmtiriy Castle MD On:4/1/2021 1:18 PM This report was finalized on [id number] by  Dmitriy Castle MD.                                           MDM  Number of Diagnoses or Management Options  Acute pain of left shoulder  Closed nondisplaced fracture of middle phalanx of right middle finger, initial encounter  Contusion of left lower extremity, initial encounter  Contusion of left shoulder, initial encounter  Fall, initial encounter  Injury of head, initial encounter  Neck pain  Pain of finger of right hand  Pain of left lower extremity  Strain of neck muscle, initial encounter  Diagnosis management comments: Chart Review:  Comorbidity: As per past  medical  Imaging: Was interpreted by physician and reviewed by myself:as above in ED course     Disposition/Treatment:  Appropriate PPE was worn during exam and throughout all encounters with the patient.  While in the ED patient was afebrile and appeared nontoxic showed no acute cranial focal neural deficits.  On reassessment patient is resting quietly.  She was tolerating p.o. fluids while in the ED.  X-ray of right hand was significant for fracture at the base of the middle phalanx as above.  Patient was placed in a finger splint while in the ED.  Patient tolerated well.  Otherwise x-rays including left shoulder left tib-fib and ribs ad chest were unremarkable for any acute osseous abnormalities as above.  CT of head and neck were also unremarkable as above. findings were discussed with the patient  who voiced understanding of discharge instructions along with signs and symptoms requiring return to the ED.  Upon discharged patient was in stable condition with followup for a revaluation.  Inspect was queried patient be given [place] for home.       Amount and/or Complexity of Data Reviewed  Tests in the radiology section of CPT®: reviewed        Final diagnoses:   Fall, initial encounter   Pain of finger of right hand   Closed nondisplaced fracture of middle phalanx of right middle finger, initial encounter   Contusion of left lower extremity, initial encounter   Pain of left lower extremity   Acute pain of left shoulder   Contusion of left shoulder, initial encounter   Injury of head, initial encounter   Neck pain   Strain of neck muscle, initial encounter   Rib pain on left side       ED Disposition  ED Disposition     ED Disposition Condition Comment    Discharge Stable           Ilan April L, APRN  [address] [facility] [address]  [phone]    Schedule an appointment as soon as possible for a visit in 3 days      [facility] EMERGENCY DEPARTMENT  [facility]  [address] [phone]  Go to   If symptoms worsen    [facility] [place]  [address] [place] [address]  [facility] [address]  [phone]  Schedule an appointment as soon as possible for a visit   in regards to your finger fracture.         Medication List      Changed    * HYDROcodone-acetaminophen 7.5-325 MG per tablet  Commonly known as: NORCO  Take 1 tablet by mouth Every 6 (Six) Hours As Needed for Moderate Pain  (Pain).  What changed: Another medication with the same name was added. Make sure you understand how and when to take each.     * HYDROcodone-acetaminophen 5-325 MG per tablet  Commonly known as: NORCO  Take 1 tablet by mouth Every 6 (Six) Hours As Needed for Moderate Pain .  What changed: You were already taking a medication with the same name, and this prescription was added. Make sure you understand how and when to take each.         * This list has 2 medication(s) that are the same as other medications prescribed for you. Read the directions carefully, and ask your doctor or other care provider to review them with you.               Where to Get Your Medications      These medications were sent to [id number] [place] - [address] AT [address] - [phone]  - [phone] FX  [address] [place] [address]    Phone: [phone]   · HYDROcodone-acetaminophen 5-325 MG per tablet          Matthew Amador PA  04/01/21 1061

## 2021-05-21 PROCEDURE — 99284 EMERGENCY DEPT VISIT MOD MDM: CPT

## 2021-05-22 ENCOUNTER — APPOINTMENT (OUTPATIENT)
Dept: CT IMAGING | Facility: HOSPITAL | Age: 71
End: 2021-05-22

## 2021-05-22 ENCOUNTER — APPOINTMENT (OUTPATIENT)
Dept: GENERAL RADIOLOGY | Facility: HOSPITAL | Age: 71
End: 2021-05-22

## 2021-05-22 ENCOUNTER — HOSPITAL ENCOUNTER (EMERGENCY)
Facility: HOSPITAL | Age: 71
Discharge: HOME | End: 2021-05-22
Attending: EMERGENCY MEDICINE | Admitting: EMERGENCY MEDICINE

## 2021-05-22 VITALS
HEART RATE: 79 BPM | BODY MASS INDEX: 34.93 KG/M2 | WEIGHT: 217.37 LBS | HEIGHT: 66 IN | DIASTOLIC BLOOD PRESSURE: 76 MMHG | SYSTOLIC BLOOD PRESSURE: 128 MMHG | TEMPERATURE: 97.6 F | RESPIRATION RATE: 20 BRPM | OXYGEN SATURATION: 99 %

## 2021-05-22 DIAGNOSIS — R60.0 PEDAL EDEMA: Primary | ICD-10-CM

## 2021-05-22 DIAGNOSIS — R06.00 DYSPNEA, UNSPECIFIED TYPE: ICD-10-CM

## 2021-05-22 LAB
ALBUMIN SERPL-MCNC: 4.3 G/DL (ref 3.5–5.2)
ALBUMIN/GLOB SERPL: 1.4 G/DL
ALP SERPL-CCNC: 111 U/L (ref 39–117)
ALT SERPL W P-5'-P-CCNC: 40 U/L (ref 1–33)
ANION GAP SERPL CALCULATED.3IONS-SCNC: 14 MMOL/L (ref 5–15)
APTT PPP: 23.6 SECONDS (ref 24–31)
AST SERPL-CCNC: 40 U/L (ref 1–32)
BASOPHILS # BLD AUTO: 0.1 10*3/MM3 (ref 0–0.2)
BASOPHILS NFR BLD AUTO: 0.7 % (ref 0–1.5)
BILIRUB SERPL-MCNC: 0.4 MG/DL (ref 0–1.2)
BILIRUB UR QL STRIP: NEGATIVE
BUN SERPL-MCNC: 19 MG/DL (ref 8–23)
BUN/CREAT SERPL: 19 (ref 7–25)
CALCIUM SPEC-SCNC: 9.6 MG/DL (ref 8.6–10.5)
CHLORIDE SERPL-SCNC: 101 MMOL/L (ref 98–107)
CLARITY UR: CLEAR
CO2 SERPL-SCNC: 25 MMOL/L (ref 22–29)
COLOR UR: YELLOW
CREAT SERPL-MCNC: 1 MG/DL (ref 0.57–1)
D DIMER PPP FEU-MCNC: 0.96 MG/L (FEU) (ref 0–0.59)
DEPRECATED RDW RBC AUTO: 46.8 FL (ref 37–54)
EOSINOPHIL # BLD AUTO: 0.1 10*3/MM3 (ref 0–0.4)
EOSINOPHIL NFR BLD AUTO: 1.5 % (ref 0.3–6.2)
ERYTHROCYTE [DISTWIDTH] IN BLOOD BY AUTOMATED COUNT: 14 % (ref 12.3–15.4)
GFR SERPL CREATININE-BSD FRML MDRD: 55 ML/MIN/1.73
GLOBULIN UR ELPH-MCNC: 3 GM/DL
GLUCOSE SERPL-MCNC: 96 MG/DL (ref 65–99)
GLUCOSE UR STRIP-MCNC: NEGATIVE MG/DL
HCT VFR BLD AUTO: 42.5 % (ref 34–46.6)
HGB BLD-MCNC: 14.4 G/DL (ref 12–15.9)
HGB UR QL STRIP.AUTO: NEGATIVE
INR PPP: 0.96 (ref 0.93–1.1)
KETONES UR QL STRIP: NEGATIVE
LEUKOCYTE ESTERASE UR QL STRIP.AUTO: NEGATIVE
LYMPHOCYTES # BLD AUTO: 1.8 10*3/MM3 (ref 0.7–3.1)
LYMPHOCYTES NFR BLD AUTO: 22.4 % (ref 19.6–45.3)
MCH RBC QN AUTO: 32.7 PG (ref 26.6–33)
MCHC RBC AUTO-ENTMCNC: 34 G/DL (ref 31.5–35.7)
MCV RBC AUTO: 96.3 FL (ref 79–97)
MONOCYTES # BLD AUTO: 0.4 10*3/MM3 (ref 0.1–0.9)
MONOCYTES NFR BLD AUTO: 5.2 % (ref 5–12)
NEUTROPHILS NFR BLD AUTO: 5.5 10*3/MM3 (ref 1.7–7)
NEUTROPHILS NFR BLD AUTO: 70.2 % (ref 42.7–76)
NITRITE UR QL STRIP: NEGATIVE
NRBC BLD AUTO-RTO: 0 /100 WBC (ref 0–0.2)
NT-PROBNP SERPL-MCNC: 268.4 PG/ML (ref 0–900)
PH UR STRIP.AUTO: 6 [PH] (ref 5–8)
PLATELET # BLD AUTO: 262 10*3/MM3 (ref 140–450)
PMV BLD AUTO: 8 FL (ref 6–12)
POTASSIUM SERPL-SCNC: 3.5 MMOL/L (ref 3.5–5.2)
PROT SERPL-MCNC: 7.3 G/DL (ref 6–8.5)
PROT UR QL STRIP: NEGATIVE
PROTHROMBIN TIME: 10.6 SECONDS (ref 9.6–11.7)
RBC # BLD AUTO: 4.41 10*6/MM3 (ref 3.77–5.28)
SODIUM SERPL-SCNC: 140 MMOL/L (ref 136–145)
SP GR UR STRIP: 1.01 (ref 1–1.03)
TROPONIN T SERPL-MCNC: <0.01 NG/ML (ref 0–0.03)
UROBILINOGEN UR QL STRIP: NORMAL
WBC # BLD AUTO: 7.9 10*3/MM3 (ref 3.4–10.8)

## 2021-05-22 PROCEDURE — 25010000002 FUROSEMIDE PER 20 MG: Performed by: EMERGENCY MEDICINE

## 2021-05-22 PROCEDURE — 71045 X-RAY EXAM CHEST 1 VIEW: CPT

## 2021-05-22 PROCEDURE — 71275 CT ANGIOGRAPHY CHEST: CPT

## 2021-05-22 PROCEDURE — 93005 ELECTROCARDIOGRAM TRACING: CPT | Performed by: EMERGENCY MEDICINE

## 2021-05-22 PROCEDURE — 81003 URINALYSIS AUTO W/O SCOPE: CPT | Performed by: EMERGENCY MEDICINE

## 2021-05-22 PROCEDURE — 85379 FIBRIN DEGRADATION QUANT: CPT | Performed by: EMERGENCY MEDICINE

## 2021-05-22 PROCEDURE — 85730 THROMBOPLASTIN TIME PARTIAL: CPT | Performed by: EMERGENCY MEDICINE

## 2021-05-22 PROCEDURE — 84484 ASSAY OF TROPONIN QUANT: CPT | Performed by: EMERGENCY MEDICINE

## 2021-05-22 PROCEDURE — 96374 THER/PROPH/DIAG INJ IV PUSH: CPT

## 2021-05-22 PROCEDURE — 85025 COMPLETE CBC W/AUTO DIFF WBC: CPT | Performed by: EMERGENCY MEDICINE

## 2021-05-22 PROCEDURE — 80053 COMPREHEN METABOLIC PANEL: CPT | Performed by: EMERGENCY MEDICINE

## 2021-05-22 PROCEDURE — 83880 ASSAY OF NATRIURETIC PEPTIDE: CPT | Performed by: EMERGENCY MEDICINE

## 2021-05-22 PROCEDURE — 85610 PROTHROMBIN TIME: CPT | Performed by: EMERGENCY MEDICINE

## 2021-05-22 PROCEDURE — 0 IOPAMIDOL PER 1 ML: Performed by: EMERGENCY MEDICINE

## 2021-05-22 RX ORDER — ASPIRIN 81 MG/1
324 TABLET, CHEWABLE ORAL ONCE
Status: COMPLETED | OUTPATIENT
Start: 2021-05-22 | End: 2021-05-22

## 2021-05-22 RX ORDER — FUROSEMIDE 10 MG/ML
20 INJECTION INTRAMUSCULAR; INTRAVENOUS ONCE
Status: COMPLETED | OUTPATIENT
Start: 2021-05-22 | End: 2021-05-22

## 2021-05-22 RX ADMIN — FUROSEMIDE 20 MG: 10 INJECTION, SOLUTION INTRAMUSCULAR; INTRAVENOUS at 04:29

## 2021-05-22 RX ADMIN — ASPIRIN 324 MG: 81 TABLET, CHEWABLE ORAL at 00:43

## 2021-05-22 RX ADMIN — IOPAMIDOL 100 ML: 755 INJECTION, SOLUTION INTRAVENOUS at 03:35

## 2021-05-22 NOTE — ED PROVIDER NOTES
Subjective   71-year-old female with bilateral lower extremity leg swelling for the past several days associated with dyspnea and mild substernal chest tightness.  Patient also reports decreased urine output.  Patient has been drinking more water secondary to having diarrhea.  No abdominal pain or vomiting or known sick contacts.  Patient has associated nonproductive cough but no fever.  Patient had her second Covid vaccination in February.  Patient denies any history of CHF or coronary artery disease.  Patient has a remote history of DVT PE.  Patient has a history of an IVC filter placement.  Patient denies any recent trips or travel, recent hospitalizations, recent surgeries or procedures.          Review of Systems   Respiratory: Positive for cough and shortness of breath.    Cardiovascular: Positive for chest pain and leg swelling.   Gastrointestinal: Positive for diarrhea.   All other systems reviewed and are negative.      Past Medical History:   Diagnosis Date   • Ankylosing spondylitis (CMS/HCC)    • Arthritis    • Asthma    • Connective tissue disease (CMS/HCC)    • GERD (gastroesophageal reflux disease)    • Hypothyroidism 2/13/2020   • Pulmonary fibrosis (CMS/[facility])    • Raynaud's disease        Allergies   Allergen Reactions   • Bee Venom Anaphylaxis   • [place] Flavor Swelling   • Adhesive Tape Other (See Comments)     Sensitive   • Flexeril [Cyclobenzaprine] Other (See Comments)     Hypotension   • Metoclopramide Other (See Comments)     Tardive diskinesia  Tardive diskinesia  tardive dyskinesia     • Morphine Hallucinations   • Penicillins Other (See Comments)     Childhood   • Sulfa Antibiotics Other (See Comments)     Childhood       Past Surgical History:   Procedure Laterality Date   • APPENDECTOMY     • BARIATRIC SURGERY  2010   • BUNIONECTOMY     • CHOLECYSTECTOMY     • HAMMER TOE REPAIR Left 6/5/2020    Procedure: HAMMER TOE REPAIR third and fourth toe;  Surgeon: TRELL Pérez DPM;  Location:  [facility] MAIN OR;  Service: Podiatry;  Laterality: Left;   • KNEE SURGERY Right 2009   • SHOULDER SURGERY Bilateral 2011 and 2012       Family History   Adopted: Yes   Family history unknown: Yes       Social History     Socioeconomic History   • Marital status:      Spouse name: Not on file   • Number of children: Not on file   • Years of education: Not on file   • Highest education level: Not on file   Tobacco Use   • Smoking status: Never Smoker   • Smokeless tobacco: Never Used   Substance and Sexual Activity   • Alcohol use: Yes   • Drug use: Never   • Sexual activity: Defer           Objective   Physical Exam  Constitutional:       Appearance: She is well-developed.   HENT:      Head: Normocephalic and atraumatic.      Mouth/Throat:      Mouth: Mucous membranes are moist.      Pharynx: Oropharynx is clear.   Eyes:      Conjunctiva/sclera: Conjunctivae normal.      Pupils: Pupils are equal, round, and reactive to light.   Neck:      Comments: No JVD appreciated  Cardiovascular:      Rate and Rhythm: Normal rate and regular rhythm.      Heart sounds: Normal heart sounds.   Pulmonary:      Effort: Pulmonary effort is normal.      Breath sounds: Normal breath sounds.   Abdominal:      General: Bowel sounds are normal. There is no distension.      Palpations: Abdomen is soft.      Tenderness: There is no abdominal tenderness.   Musculoskeletal:      Cervical back: Normal range of motion and neck supple.      Comments: 1-2+ pedal edema, symmetric bilateral lower extremities, no calf tenderness   Skin:     General: Skin is warm and dry.      Capillary Refill: Capillary refill takes less than 2 seconds.   Neurological:      General: No focal deficit present.      Mental Status: She is alert and oriented to person, place, and time.   Psychiatric:         Mood and Affect: Mood normal.         Behavior: Behavior normal.         Procedures           ED Course  ED Course as of May 22 0426   Sat May 22, 2021   0012 EKG  interpretation: Normal sinus rhythm, rate 85, no acute ST change    [JR]      ED Course User Index  [JR] Andrew Mayo MD                                           MDM  Number of Diagnoses or Management Options  Dyspnea, unspecified type  Pedal edema  Diagnosis management comments: Results for orders placed or performed during the hospital encounter of 05/22/21  -Comprehensive Metabolic Panel  Specimen: Blood       Result                      Value             Ref Range           Glucose                     96                65 - 99 mg/dL       BUN                         19                8 - 23 mg/dL        Creatinine                  1.00              0.57 - 1.00 *       Sodium                      140               136 - 145 mm*       Potassium                   3.5               3.5 - 5.2 mm*       Chloride                    101               98 - 107 mmo*       CO2                         25.0              22.0 - 29.0 *       Calcium                     9.6               8.6 - 10.5 m*       Total Protein               7.3               6.0 - 8.5 g/*       Albumin                     4.30              3.50 - 5.20 *       ALT (SGPT)                  40 (H)            1 - 33 U/L          AST (SGOT)                  40 (H)            1 - 32 U/L          Alkaline Phosphatase        111               39 - 117 U/L        Total Bilirubin             0.4               0.0 - 1.2 mg*       eGFR Non  Amer       55 (L)            >60 mL/min/1*       Globulin                    3.0               gm/dL               A/G Ratio                   1.4               g/dL                BUN/Creatinine Ratio        19.0              7.0 - 25.0          Anion Gap                   14.0              5.0 - 15.0 m*  -Protime-INR  Specimen: Blood       Result                      Value             Ref Range           Protime                     10.6              9.6 - 11.7 S*       INR                         0.96               0.93 - 1.10    -aPTT  Specimen: Blood       Result                      Value             Ref Range           PTT                         23.6 (L)          24.0 - 31.0 *  -Urinalysis With Culture If Indicated - Urine, Clean Catch  Specimen: Urine, Clean Catch       Result                      Value             Ref Range           Color, UA                   Yellow            Yellow, Straw       Appearance, UA              Clear             Clear               pH, UA                      6.0               5.0 - 8.0           Specific [place], UA        1.009             1.005 - 1.030       Glucose, UA                 Negative          Negative            Ketones, UA                 Negative          Negative            Bilirubin, UA               Negative          Negative            Blood, UA                   Negative          Negative            Protein, UA                 Negative          Negative            Leuk Esterase, UA           Negative          Negative            Nitrite, UA                 Negative          Negative            Urobilinogen, UA            0.2 E.U./dL       0.2 - 1.0 E.*  -BNP  Specimen: Blood       Result                      Value             Ref Range           proBNP                      268.4             0.0 - 900.0 *  -Troponin  Specimen: Blood       Result                      Value             Ref Range           Troponin T                  <0.010            0.000 - 0.03*  -CBC Auto Differential  Specimen: Blood       Result                      Value             Ref Range           WBC                         7.90              3.40 - 10.80*       RBC                         4.41              3.77 - 5.28 *       Hemoglobin                  14.4              12.0 - 15.9 *       Hematocrit                  42.5              34.0 - 46.6 %       MCV                         96.3              79.0 - 97.0 *       MCH                         32.7              26.6 - 33.0 *       MCHC                         34.0              31.5 - 35.7 *       RDW                         14.0              12.3 - 15.4 %       RDW-SD                      46.8              37.0 - 54.0 *       MPV                         8.0               6.0 - 12.0 fL       Platelets                   262               140 - 450 10*       Neutrophil %                70.2              42.7 - 76.0 %       Lymphocyte %                22.4              19.6 - 45.3 %       Monocyte %                  5.2               5.0 - 12.0 %        Eosinophil %                1.5               0.3 - 6.2 %         Basophil %                  0.7               0.0 - 1.5 %         Neutrophils, Absolute       5.50              1.70 - 7.00 *       Lymphocytes, Absolute       1.80              0.70 - 3.10 *       Monocytes, Absolute         0.40              0.10 - 0.90 *       Eosinophils, Absolute       0.10              0.00 - 0.40 *       Basophils, Absolute         0.10              0.00 - 0.20 *       nRBC                        0.0               0.0 - 0.2 /1*  -D-dimer, Quantitative  Specimen: Blood       Result                      Value             Ref Range           D-Dimer, Quantitative       0.96 (H)          0.00 - 0.59 *  -ECG 12 Lead       Result                      Value             Ref Range           QT Interval                 402               ms               CT Chest Pulmonary Embolism   Final Result        1. No evidence of pulmonary embolism.    2. No evidence of thoracic aortic aneurysm or dissection.    3. Volume loss in the right middle lobe with parenchymal changes that may be due to chronic atelectasis with infection also a consideration.    4. Laparoscopic band around the gastroesophageal junction with moderate dilation of the entire esophagus along its course which is fluid and debris filled.            Electronically signed by:  Sidney Flood D.O.      5/22/2021 1:49 AM     XR Chest 1 View   ED Interpretation    nad      Patient appears well, denies any chest pain or dyspnea.  Patient does clarify that her chest complaints are chronic and not new.  Patient states her main concern was for pedal edema which is bilateral and symmetric without pain or tenderness.  Patient requests IV dose of Lasix and discharge.  Patient declines admission.  CT findings appear similar to right middle lobe changes seen on CT of the chest June 2020.  Patient aware of fluid distention of esophagus and reports this is a chronic issue.  This was also mentioned on her prior CT of the chest in June 2020.         Amount and/or Complexity of Data Reviewed  Clinical lab tests: ordered and reviewed  Tests in the radiology section of CPT®: ordered and reviewed  Tests in the medicine section of CPT®: ordered and reviewed  Decide to obtain previous medical records or to obtain history from someone other than the patient: yes  Independent visualization of images, tracings, or specimens: yes        Final diagnoses:   Pedal edema   Dyspnea, unspecified type       ED Disposition  ED Disposition     ED Disposition Condition Comment    Discharge Stable           Kenyatta Talavera, APRN  [address] [id number] [phone]    Schedule an appointment as soon as possible for a visit            Medication List      No changes were made to your prescriptions during this visit.          Andrew Mayo MD  05/22/21 042

## 2021-05-22 NOTE — ED NOTES
"Patient reports increased dependent edema for the last several days, worsening over the last 24 hours. Tonight reports increased SOA and a feeling of \"drowning\" 3+ edema in feet and lower legs. Reports 8lb weight gain in the last 2 days.      Nancy Barnes, LPN  05/22/21 0018    "

## 2021-05-22 NOTE — DISCHARGE INSTRUCTIONS
Review all testing on the day including CT of the chest report with your family practitioner at your next appointment.

## 2021-05-23 LAB — QT INTERVAL: 402 MS

## 2021-07-26 ENCOUNTER — APPOINTMENT (OUTPATIENT)
Dept: GENERAL RADIOLOGY | Facility: HOSPITAL | Age: 71
End: 2021-07-26

## 2021-07-26 ENCOUNTER — HOSPITAL ENCOUNTER (EMERGENCY)
Facility: HOSPITAL | Age: 71
Discharge: HOME | End: 2021-07-26
Admitting: EMERGENCY MEDICINE

## 2021-07-26 VITALS
HEIGHT: 66 IN | HEART RATE: 85 BPM | SYSTOLIC BLOOD PRESSURE: 115 MMHG | RESPIRATION RATE: 16 BRPM | OXYGEN SATURATION: 96 % | TEMPERATURE: 97.7 F | WEIGHT: 198 LBS | BODY MASS INDEX: 31.82 KG/M2 | DIASTOLIC BLOOD PRESSURE: 78 MMHG

## 2021-07-26 DIAGNOSIS — S91.115A LACERATION OF LESSER TOE OF LEFT FOOT WITHOUT FOREIGN BODY PRESENT OR DAMAGE TO NAIL, INITIAL ENCOUNTER: Primary | ICD-10-CM

## 2021-07-26 DIAGNOSIS — M79.675 PAIN OF TOE OF LEFT FOOT: ICD-10-CM

## 2021-07-26 PROCEDURE — 90471 IMMUNIZATION ADMIN: CPT | Performed by: PHYSICIAN ASSISTANT

## 2021-07-26 PROCEDURE — 25010000003 CEFAZOLIN PER 500 MG: Performed by: PHYSICIAN ASSISTANT

## 2021-07-26 PROCEDURE — 90715 TDAP VACCINE 7 YRS/> IM: CPT | Performed by: PHYSICIAN ASSISTANT

## 2021-07-26 PROCEDURE — 96365 THER/PROPH/DIAG IV INF INIT: CPT

## 2021-07-26 PROCEDURE — 73110 X-RAY EXAM OF WRIST: CPT

## 2021-07-26 PROCEDURE — 99283 EMERGENCY DEPT VISIT LOW MDM: CPT

## 2021-07-26 PROCEDURE — 25010000002 TDAP 5-2.5-18.5 LF-MCG/0.5 SUSPENSION: Performed by: PHYSICIAN ASSISTANT

## 2021-07-26 PROCEDURE — 73630 X-RAY EXAM OF FOOT: CPT

## 2021-07-26 RX ORDER — HYDROCODONE BITARTRATE AND ACETAMINOPHEN 7.5; 325 MG/1; MG/1
1 TABLET ORAL ONCE
Status: COMPLETED | OUTPATIENT
Start: 2021-07-26 | End: 2021-07-26

## 2021-07-26 RX ORDER — CEPHALEXIN 500 MG/1
500 CAPSULE ORAL 3 TIMES DAILY
Qty: 15 CAPSULE | Refills: 0 | Status: SHIPPED | OUTPATIENT
Start: 2021-07-26 | End: 2021-07-31

## 2021-07-26 RX ORDER — HYDROCODONE BITARTRATE AND ACETAMINOPHEN 5; 325 MG/1; MG/1
1 TABLET ORAL EVERY 6 HOURS PRN
Qty: 12 TABLET | Refills: 0 | Status: SHIPPED | OUTPATIENT
Start: 2021-07-26

## 2021-07-26 RX ORDER — SODIUM CHLORIDE 0.9 % (FLUSH) 0.9 %
10 SYRINGE (ML) INJECTION AS NEEDED
Status: DISCONTINUED | OUTPATIENT
Start: 2021-07-26 | End: 2021-07-26 | Stop reason: HOSPADM

## 2021-07-26 RX ADMIN — HYDROCODONE BITARTRATE AND ACETAMINOPHEN 1 TABLET: 7.5; 325 TABLET ORAL at 12:33

## 2021-07-26 RX ADMIN — CEFAZOLIN SODIUM 1 G: 1 INJECTION, POWDER, FOR SOLUTION INTRAMUSCULAR; INTRAVENOUS at 14:01

## 2021-07-26 RX ADMIN — TETANUS TOXOID, REDUCED DIPHTHERIA TOXOID AND ACELLULAR PERTUSSIS VACCINE, ADSORBED 0.5 ML: 5; 2.5; 8; 8; 2.5 SUSPENSION INTRAMUSCULAR at 12:33

## 2021-07-26 NOTE — DISCHARGE INSTRUCTIONS
Take Keflex antibiotics to completion  Take Norco as needed for pain.  Keep area clean, wash gently with soap and water at least once per day.  Do not soak in a bath or soak in dirty water.  Have sutures removed in 7 days.  Keep dry dressing on, may place antibiotic ointment over top the sutures.    Follow-up with primary care for further management evaluation  Follow-up with Dr. Pérez, podiatry for reevaluation as needed    Return to the ER for new or worsening symptoms

## 2021-07-26 NOTE — ED NOTES
"Pt reports she has a toe that normally hangs down and it got caught on something while she was walking and the pt fell. Pt reports she the third toe on her left foot is now \"hanging by a thread.\" Pt reports feeling dizzy.     Gloria Nagel RN  07/26/21 1111    "

## 2021-07-26 NOTE — ED PROVIDER NOTES
"Subjective   Chief Complaint: Toe pain, laceration    Patient is a 71-year-old  female history of chronic back pain, GERD, hypothyroidism presents to the ER with complaint of left toe pain and injury after she tripped today.  Patient states that she tripped at home, fell on the ground landing on her knees and her wrists.  Patient states she noticed blood all over the ground and was that she had a laceration to her toe and looks like her \"toe was falling off\".  Patient describes the pain in her toe as a constant throbbing pain that she rates a 10/10.  There is a laceration over PIP middle toe on the left foot, toe appears to be angulated.  Patient was complaining of right wrist pain that she describes as a dull ache.  She denies hitting her head or LOC.  Patient does not know when her last tetanus shot was.    Location: Left toe, right wrist    Quality: Rubbing, aching    Duration: Today    Timing: Constant    Severity: Moderate    Associated Symptoms: None    PCP: Kenyatta Talavera  Podiatrist: Dr. Pérez      History provided by:  Patient      Review of Systems   Constitutional: Negative for chills and fever.   HENT: Negative for sore throat and trouble swallowing.    Respiratory: Negative for shortness of breath and wheezing.    Cardiovascular: Negative for chest pain.   Gastrointestinal: Negative for abdominal pain.   Genitourinary: Negative for dysuria.   Musculoskeletal: Positive for arthralgias.        Left toe pain, right wrist pain   Skin: Positive for wound. Negative for rash.        Laceration left toe   Neurological: Negative for headaches.   Psychiatric/Behavioral: Negative for behavioral problems.   All other systems reviewed and are negative.      Past Medical History:   Diagnosis Date   • Ankylosing spondylitis (CMS/[facility])    • Arthritis    • Asthma    • Connective tissue disease (CMS/[facility])    • GERD (gastroesophageal reflux disease)    • Hypothyroidism 2/13/2020   • Pulmonary fibrosis (CMS/[facility])  "   • Raynaud's disease        Allergies   Allergen Reactions   • Bee Venom Anaphylaxis   • [place] Flavor Swelling   • Adhesive Tape Other (See Comments)     Sensitive   • Flexeril [Cyclobenzaprine] Other (See Comments)     Hypotension   • Metoclopramide Other (See Comments)     Tardive diskinesia  Tardive diskinesia  tardive dyskinesia     • Morphine Hallucinations   • Penicillins Other (See Comments)     Childhood   • Sulfa Antibiotics Other (See Comments)     Childhood       Past Surgical History:   Procedure Laterality Date   • APPENDECTOMY     • BARIATRIC SURGERY  2010   • BUNIONECTOMY     • CHOLECYSTECTOMY     • HAMMER TOE REPAIR Left 6/5/2020    Procedure: HAMMER TOE REPAIR third and fourth toe;  Surgeon: TRELL Pérez DPM;  Location: [facility] MAIN OR;  Service: Podiatry;  Laterality: Left;   • KNEE SURGERY Right 2009   • SHOULDER SURGERY Bilateral 2011 and 2012       Family History   Adopted: Yes   Family history unknown: Yes       Social History     Socioeconomic History   • Marital status:      Spouse name: Not on file   • Number of children: Not on file   • Years of education: Not on file   • Highest education level: Not on file   Tobacco Use   • Smoking status: Never Smoker   • Smokeless tobacco: Never Used   Substance and Sexual Activity   • Alcohol use: Yes   • Drug use: Never   • Sexual activity: Defer           Objective   Physical Exam  Vitals and nursing note reviewed.   Constitutional:       Appearance: Normal appearance. She is well-developed and normal weight. She is not ill-appearing or toxic-appearing.   HENT:      Head: Normocephalic and atraumatic.   Eyes:      Pupils: Pupils are equal, round, and reactive to light.   Cardiovascular:      Rate and Rhythm: Normal rate and regular rhythm.      Heart sounds: Normal heart sounds.   Pulmonary:      Effort: Pulmonary effort is normal. No respiratory distress.      Breath sounds: Normal breath sounds. No wheezing.   Abdominal:      General:  Bowel sounds are normal. There is no distension.      Palpations: Abdomen is soft.      Tenderness: There is no abdominal tenderness.   Musculoskeletal:         General: Tenderness and deformity present. Normal range of motion.      Comments: Tenderness to palpation left foot, worse over left middle toe    Tenderness palpation right wrist, tenderness over anatomic snuffbox.  Patient has full range of motion of right wrist, all digits on right hand.  Radial pulses present 2+ bilaterally, sensation soft touch intact   Skin:     General: Skin is warm and dry.      Capillary Refill: Capillary refill takes less than 2 seconds.      Findings: No rash.      Comments: 1 cm laceration overlying left middle toe       Neurological:      General: No focal deficit present.      Mental Status: She is alert and oriented to person, place, and time.   Psychiatric:         Mood and Affect: Mood normal.         Behavior: Behavior normal.         Laceration Repair    Date/Time: 7/26/2021 4:36 PM  Performed by: Luciana Frank PA  Authorized by: Luciana Frank PA     Consent:     Consent obtained:  Verbal    Consent given by:  Patient    Risks discussed:  Infection and pain    Alternatives discussed:  No treatment and delayed treatment  Anesthesia (see MAR for exact dosages):     Anesthesia method:  Local infiltration    Local anesthetic:  Lidocaine 1% w/o epi  Laceration details:     Location:  Toe    Toe location:  L third toe    Length (cm):  1.3    Depth (mm):  2  Pre-procedure details:     Preparation:  Patient was prepped and draped in usual sterile fashion  Exploration:     Hemostasis achieved with:  Direct pressure    Wound extent: no tendon damage noted and no underlying fracture noted    Treatment:     Area cleansed with:  Shur-Clens    Amount of cleaning:  Standard  Skin repair:     Repair method:  Sutures    Suture size:  5-0    Suture material:  Nylon    Suture technique:  Simple interrupted    Number of sutures:   "7  Post-procedure details:     Dressing:  Antibiotic ointment, non-adherent dressing and splint for protection    Patient tolerance of procedure:  Tolerated well, no immediate complications               ED Course  ED Course as of Jul 26 1637   Mon Jul 26, 2021   1332 Consult placed to Dr. Pérez    [MM]   1422 Spoke with Dr. Pérez, agreed with plan of IV antibiotics, laceration repair, outpatient follow-up    [MM]      ED Course User Index  [MM] Luciana Frank PA    /78 (BP Location: Left arm, Patient Position: Lying)   Pulse 85   Temp 97.7 °F (36.5 °C) (Oral)   Resp 16   Ht 167.6 cm (66\")   Wt 89.8 kg (198 lb)   SpO2 96%   BMI 31.96 kg/m²   Labs Reviewed - No data to display  Medications   sodium chloride 0.9 % flush 10 mL (has no administration in time range)   Tdap (BOOSTRIX) injection 0.5 mL (0.5 mL Intramuscular Given 7/26/21 1233)   HYDROcodone-acetaminophen (NORCO) 7.5-325 MG per tablet 1 tablet (1 tablet Oral Given 7/26/21 1233)   ceFAZolin 1 gm IVPB in 100 mL NS (MBP) (0 g Intravenous Stopped 7/26/21 1627)     XR Wrist 3+ View Right    Result Date: 7/26/2021  Osteopenia and degenerative changes of the right wrist. No acute right wrist findings.  Electronically Signed By-Otilia Sexton MD On:7/26/2021 12:42 PM This report was finalized on [id number] by  Otilia Sexton MD.    XR Foot 3+ View Left    Result Date: 7/26/2021   1. No acute left foot finding. 2. Degenerative, posttraumatic, and postsurgical changes of the left foot, as described. These findings are thought to be similar to the 7/2/2020 examination.  Electronically Signed By-Otilia Sexton MD On:7/26/2021 12:45 PM This report was finalized on [id number] by  Otilia Sexton MD.                                           MDM  Number of Diagnoses or Management Options  Laceration of lesser toe of left foot without foreign body present or damage to nail, initial encounter  Pain of toe of left foot  Diagnosis management comments: " MEDICAL DECISION  Epic Chart Review:  Comorbidities: Osteopenia GERD, hypothyroid  Differentials: Fracture, degloving injury, laceration; this list is not all inclusive and does not constitute the entirety of considered causes  Radiology interpretation:  Images reviewed by me and interpreted by radiologist, as above    While in the ED IV was placed appropriate PPE was worn during exam and throughout all encounters with the patient.  Patient had the above evaluation.  Patient given tetanus while in the ER.  Wound was cleansed thoroughly per nursing staff.  X-ray shows no acute osseous abnormality, previous osteotomy for hammertoe.  X-ray right wrist shows no acute osseous abnormality.  Patient has 1.3 cm laceration to the dorsal aspect of the third toe on the left foot.  Sensation to soft touch intact, cap refill appropriate.  Wound was cleansed thoroughly per nursing staff, anesthetized with 1% lidocaine without epinephrine and laceration repair was performed as indicated in the above procedure note.  Patient tolerated procedure well.  Patient was given 1 g Ancef IV prior to discharge.  Patient also given Norco for pain.  Inspect reviewed.  Patient was discharged with prescription for Norco, Keflex and encouraged follow-up with primary care and Dr. Pérez for further evaluation.  Patient to have sutures removed in 7 days.  Patient given postop shoe for comfort and protection, patient neurovascular tact distally post replacement.    Discharge plan and instructions were discussed with the patient who verbalized understanding and is in agreement with the plan, all questions were answered at this time.  Patient is aware of signs symptoms that would require immediate return to the emergency room.  Patient understands importance of following up with primary care provider for further evaluation and worsening concerns as well as blood pressure recheck in the next 4 weeks.    Patient remained afebrile, nontoxic-appearing, no  acute respiratory distress throughout entire emergency room stay.  Patient was discharged in improved stable condition with an upright steady gait.         Amount and/or Complexity of Data Reviewed  Tests in the radiology section of CPT®: reviewed and ordered    Patient Progress  Patient progress: stable      Final diagnoses:   Laceration of lesser toe of left foot without foreign body present or damage to nail, initial encounter   Pain of toe of left foot       ED Disposition  ED Disposition     ED Disposition Condition Comment    Discharge Stable           Kenyatta Talavera, APRN  [address] [place] [address]  [phone]    Schedule an appointment as soon as possible for a visit in 2 days  As needed, If symptoms worsen    TRELL Pérez DPM  [address] [place] [address]  [phone]    Schedule an appointment as soon as possible for a visit in 2 days  As needed, If symptoms worsen         Medication List      New Prescriptions    cephalexin 500 MG capsule  Commonly known as: KEFLEX  Take 1 capsule by mouth 3 (Three) Times a Day for 5 days.        Changed    * HYDROcodone-acetaminophen 7.5-325 MG per tablet  Commonly known as: NORCO  Take 1 tablet by mouth Every 6 (Six) Hours As Needed for Moderate Pain  (Pain).  What changed: Another medication with the same name was added. Make sure you understand how and when to take each.     * HYDROcodone-acetaminophen 5-325 MG per tablet  Commonly known as: NORCO  Take 1 tablet by mouth Every 6 (Six) Hours As Needed for Moderate Pain .  What changed: Another medication with the same name was added. Make sure you understand how and when to take each.     * HYDROcodone-acetaminophen 5-325 MG per tablet  Commonly known as: NORCO  Take 1 tablet by mouth Every 6 (Six) Hours As Needed for Moderate Pain .  What changed: You were already taking a medication with the same name, and this prescription was added. Make sure you understand how and when to  take each.         * This list has 3 medication(s) that are the same as other medications prescribed for you. Read the directions carefully, and ask your doctor or other care provider to review them with you.               Where to Get Your Medications      These medications were sent to [id number] - [place] [place] [place] - [address] AT [address] - [phone]  - [phone] FX  [address], [place] [place] [address]    Phone: [phone]   · cephalexin 500 MG capsule  · HYDROcodone-acetaminophen 5-325 MG per tablet          Luciana Frank PA  07/26/21 1818

## 2021-07-29 ENCOUNTER — OFFICE VISIT (OUTPATIENT)
Dept: PODIATRY | Facility: CLINIC | Age: 71
End: 2021-07-29

## 2021-07-29 VITALS
HEART RATE: 84 BPM | BODY MASS INDEX: 31.82 KG/M2 | WEIGHT: 198 LBS | DIASTOLIC BLOOD PRESSURE: 72 MMHG | HEIGHT: 66 IN | SYSTOLIC BLOOD PRESSURE: 120 MMHG

## 2021-07-29 DIAGNOSIS — S91.115A: Primary | ICD-10-CM

## 2021-07-29 PROCEDURE — 99213 OFFICE O/P EST LOW 20 MIN: CPT | Performed by: PODIATRIST

## 2021-07-29 RX ORDER — BENZONATATE 100 MG/1
CAPSULE ORAL
COMMUNITY
Start: 2021-07-18

## 2021-07-29 RX ORDER — CHOLECALCIFEROL (VITAMIN D3) 1250 MCG
50000 CAPSULE ORAL DAILY
COMMUNITY

## 2021-07-29 RX ORDER — BENZONATATE 100 MG/1
100-200 CAPSULE ORAL
COMMUNITY
Start: 2021-07-18

## 2021-07-29 NOTE — PROGRESS NOTES
07/29/2021  Foot and Ankle Surgery - Established Patient/Follow-up  Provider: Dr. Steve Pérez DPM  Location: [facility]    Subjective:  Jo Prescott is a 71 y.o. female.     Chief Complaint   Patient presents with   • Left Foot - Follow-up     ER VISIT SLIPPED AND GOT CAUGHT UNDER A VIBRATION MACHINE 3RD TOE LEFT FOOT       HPI: Patient returns after recent incident involving her left third toe.  She states that she accidentally fell and caught her toe causing injuries to the dorsal aspect of the digit.  She reported to the ED where the laceration was repaired.  Today, she presents with sutures and states that she is doing better.  She has not noticed any progressive pain or discoloration to the digit.  No other complaints    Allergies   Allergen Reactions   • Bee Venom Anaphylaxis   • [place] Flavor Swelling   • Adhesive Tape Other (See Comments)     Sensitive   • Flexeril [Cyclobenzaprine] Other (See Comments)     Hypotension   • Metoclopramide Other (See Comments)     Tardive diskinesia  Tardive diskinesia  tardive dyskinesia     • Morphine Hallucinations   • Penicillins Other (See Comments)     Childhood   • Sulfa Antibiotics Other (See Comments)     Childhood       Current Outpatient Medications on File Prior to Visit   Medication Sig Dispense Refill   • benzonatate (TESSALON) 100 MG capsule Take 100-200 mg by mouth.     • alendronate (FOSAMAX) 70 MG tablet Take 70 mg by mouth Every 7 (Seven) Days. tues     • ALPRAZolam (XANAX) 1 MG tablet Take 1 mg by mouth As Needed.     • benzonatate (TESSALON) 100 MG capsule TAKE 1 TO 2 CAPSULES BY MOUTH THREE TIMES DAILY AS NEEDED FOR COUGH     • bimatoprost (LATISSE) 0.03 % ophthalmic solution PLACE 1 DROP ON APPLICATOR AND APPLY EVENLY ALONG THE SKIN OF THE UPPER EYELID AT BASE OF EYELASHES AT BEDTIME; REPEAT FOR THE SECOND EYE.     • BREO ELLIPTA 100-25 MCG/INH inhaler Inhale 1 puff Daily.     • celecoxib (CeleBREX) 200 MG capsule Take 200 mg by mouth  "Daily. Hold 7 days prior to surgery     • cephalexin (KEFLEX) 500 MG capsule Take 1 capsule by mouth 3 (Three) Times a Day for 5 days. 15 capsule 0   • Cholecalciferol (VITAMIN D3) 1.25 MG (47506 UT) capsule Take 50,000 Units by mouth 3 (Three) Times a Week.     • Cholecalciferol (Vitamin D3) 1.25 MG (95763 UT) capsule Take 50,000 Units by mouth Daily.     • cyanocobalamin 1000 MCG/ML injection Inject 1,000 mcg into the appropriate muscle as directed by prescriber Every 14 (Fourteen) Days.     • doxycycline (VIBRAMYCIN) 100 MG capsule      • DULoxetine (CYMBALTA) 60 MG capsule Take 60 mg by mouth Daily.     • furosemide (LASIX) 80 MG tablet Take 80 mg by mouth Daily. Hold DOS     • HYDROcodone-acetaminophen (NORCO) 5-325 MG per tablet Take 1 tablet by mouth Every 6 (Six) Hours As Needed for Moderate Pain . 12 tablet 0   • HYDROcodone-acetaminophen (NORCO) 5-325 MG per tablet Take 1 tablet by mouth Every 6 (Six) Hours As Needed for Moderate Pain . 12 tablet 0   • HYDROcodone-acetaminophen (NORCO) 7.5-325 MG per tablet Take 1 tablet by mouth Every 6 (Six) Hours As Needed for Moderate Pain  (Pain). 28 tablet 0   • levothyroxine (SYNTHROID, LEVOTHROID) 112 MCG tablet Take 112 mcg by mouth Daily.     • methocarbamol (ROBAXIN) 500 MG tablet Take 1 tablet by mouth 4 (Four) Times a Day. 12 tablet 0   • NIFEdipine XL (PROCARDIA XL) 30 MG 24 hr tablet Take 30 mg by mouth Daily. 6 months out of the year Oct through April     • omeprazole (priLOSEC) 20 MG capsule Take 20 mg by mouth Daily.     • potassium chloride (K-DUR,KLOR-CON) 20 MEQ CR tablet TAKE 1 TABLET BY MOUTH TWICE DAILY     • PROAIR RESPICLICK 108 (90 Base) MCG/ACT inhaler Inhale 2 puffs Every 4 (Four) Hours As Needed for Shortness of Air.     • rOPINIRole (REQUIP) 1 MG tablet Take 2 mg by mouth Every Night.       No current facility-administered medications on file prior to visit.       Objective   /71   Pulse 84   Ht 167.6 cm (66\")   Wt 89.8 kg (198 lb)  "  BMI 31.96 kg/m²     Foot/Ankle Exam:       General:   Appearance: appears stated age and healthy    Orientation: AAOx3    Affect: appropriate      VASCULAR      Left Foot Vascularity   Normal vascular exam    Dorsalis pedis:  2+  Posterior tibial:  2+  Skin Temperature: warm    Edema Grading:  None  CFT:  < 3 seconds  Pedal Hair Growth:  Present  Varicosities: none        NEUROLOGIC     Left Foot Neurologic   Light touch sensation:  Normal  Hot/cold sensation: normal    Achilles reflex:  2+     MUSCULOSKELETAL      Left Foot Musculoskeletal   Ecchymosis:  None  Tenderness: toe 3       MUSCLE STRENGTH     Left Foot Muscle Strength   Normal strength    Foot dorsiflexion:  5  Foot plantar flexion:  5  Foot inversion:  5  Foot eversion:  5     Image:       Assessment/Plan   Diagnoses and all orders for this visit:    1. Laceration of lesser toe without foreign body without damage to nail, left, initial encounter (Primary)      Patient presents with new issue involving her left third toe.  She did sustain a laceration which appears appropriate today.  Sutures were mostly removed.  She has no signs of infection or concerns of disc vascularity.  I have recommended that she proceed with Betadine application on a daily basis and monitor closely.  She is to call with any additional issues or concerns and I would like to see her in 1 to 2 weeks for remaining suture removal and reevaluation.  Greater than 20 minutes was spent before, during, and after evaluation for patient care    No orders of the defined types were placed in this encounter.         Note is dictated utilizing voice recognition software. Unfortunately this leads to occasional typographical errors. I apologize in advance if the situation occurs. If questions occur please do not hesitate to call our office.

## 2021-08-03 ENCOUNTER — TELEPHONE (OUTPATIENT)
Dept: PODIATRY | Facility: CLINIC | Age: 71
End: 2021-08-03

## 2021-08-03 NOTE — TELEPHONE ENCOUNTER
----- Message from TRELL Pérez DPM sent at 8/3/2021  8:03 AM EDT -----  Regarding: FW: Prescription Question  Contact: [phone]  I responded to her directly thru mychart previously regarding these questions. Please call and notify her  ----- Message -----  From: Mally Parks MA  Sent: 8/2/2021  11:22 AM EDT  To: TRELL Pérez DPM  Subject: FW: Prescription Question                        See pt message   ----- Message -----  From: Jo Prescott  Sent: 7/29/2021   6:35 PM EDT  To: [facility]  Subject: Prescription Question                            I forgot to ask if you can refill the pain medication.only for a few more days as my foot hurts by the end of the day a lot.    also when can I get the toe wet? bathing or swimming or neither.    thanks

## 2021-08-16 ENCOUNTER — OFFICE VISIT (OUTPATIENT)
Dept: PODIATRY | Facility: CLINIC | Age: 71
End: 2021-08-16

## 2021-08-16 VITALS
HEIGHT: 66 IN | SYSTOLIC BLOOD PRESSURE: 110 MMHG | TEMPERATURE: 98 F | RESPIRATION RATE: 20 BRPM | DIASTOLIC BLOOD PRESSURE: 68 MMHG | WEIGHT: 195 LBS | BODY MASS INDEX: 31.34 KG/M2 | HEART RATE: 79 BPM

## 2021-08-16 DIAGNOSIS — S91.115A: Primary | ICD-10-CM

## 2021-08-16 PROCEDURE — 99213 OFFICE O/P EST LOW 20 MIN: CPT | Performed by: PODIATRIST

## 2021-08-17 NOTE — PROGRESS NOTES
08/16/2021  Foot and Ankle Surgery - Established Patient/Follow-up  Provider: Dr. Steve Pérez DPM  Location: [facility]    Subjective:  Jo Prescott is a 71 y.o. female.     Chief Complaint   Patient presents with   • Left Foot - Pain   • Laceration     left foot lac        HPI: Patient returns for evaluation of her left third toe.  She states that she is doing well.  She is having some mild discomfort at times but feels that this is getting better.  No other issues today    Allergies   Allergen Reactions   • Bee Venom Anaphylaxis   • Thien Flavor Swelling   • Adhesive Tape Other (See Comments)     Sensitive   • Flexeril [Cyclobenzaprine] Other (See Comments)     Hypotension   • Metoclopramide Other (See Comments)     Tardive diskinesia  Tardive diskinesia  tardive dyskinesia     • Morphine Hallucinations   • Penicillins Other (See Comments)     Childhood   • Sulfa Antibiotics Other (See Comments)     Childhood       Current Outpatient Medications on File Prior to Visit   Medication Sig Dispense Refill   • alendronate (FOSAMAX) 70 MG tablet Take 70 mg by mouth Every 7 (Seven) Days. tues     • ALPRAZolam (XANAX) 1 MG tablet Take 1 mg by mouth As Needed.     • benzonatate (TESSALON) 100 MG capsule Take 100-200 mg by mouth.     • benzonatate (TESSALON) 100 MG capsule TAKE 1 TO 2 CAPSULES BY MOUTH THREE TIMES DAILY AS NEEDED FOR COUGH     • bimatoprost (LATISSE) 0.03 % ophthalmic solution PLACE 1 DROP ON APPLICATOR AND APPLY EVENLY ALONG THE SKIN OF THE UPPER EYELID AT BASE OF EYELASHES AT BEDTIME; REPEAT FOR THE SECOND EYE.     • BREO ELLIPTA 100-25 MCG/INH inhaler Inhale 1 puff Daily.     • celecoxib (CeleBREX) 200 MG capsule Take 200 mg by mouth Daily. Hold 7 days prior to surgery     • Cholecalciferol (VITAMIN D3) 1.25 MG (16780 UT) capsule Take 50,000 Units by mouth 3 (Three) Times a Week.     • Cholecalciferol (Vitamin D3) 1.25 MG (83880 UT) capsule Take 50,000 Units by mouth Daily.     •  "cyanocobalamin 1000 MCG/ML injection Inject 1,000 mcg into the appropriate muscle as directed by prescriber Every 14 (Fourteen) Days.     • doxycycline (VIBRAMYCIN) 100 MG capsule      • DULoxetine (CYMBALTA) 60 MG capsule Take 60 mg by mouth Daily.     • furosemide (LASIX) 80 MG tablet Take 80 mg by mouth Daily. Hold DOS     • HYDROcodone-acetaminophen (NORCO) 5-325 MG per tablet Take 1 tablet by mouth Every 6 (Six) Hours As Needed for Moderate Pain . 12 tablet 0   • HYDROcodone-acetaminophen (NORCO) 5-325 MG per tablet Take 1 tablet by mouth Every 6 (Six) Hours As Needed for Moderate Pain . 12 tablet 0   • HYDROcodone-acetaminophen (NORCO) 7.5-325 MG per tablet Take 1 tablet by mouth Every 6 (Six) Hours As Needed for Moderate Pain  (Pain). 28 tablet 0   • levothyroxine (SYNTHROID, LEVOTHROID) 112 MCG tablet Take 112 mcg by mouth Daily.     • methocarbamol (ROBAXIN) 500 MG tablet Take 1 tablet by mouth 4 (Four) Times a Day. 12 tablet 0   • NIFEdipine XL (PROCARDIA XL) 30 MG 24 hr tablet Take 30 mg by mouth Daily. 6 months out of the year Oct through April     • omeprazole (priLOSEC) 20 MG capsule Take 20 mg by mouth Daily.     • potassium chloride (K-DUR,KLOR-CON) 20 MEQ CR tablet TAKE 1 TABLET BY MOUTH TWICE DAILY     • PROAIR RESPICLICK 108 (90 Base) MCG/ACT inhaler Inhale 2 puffs Every 4 (Four) Hours As Needed for Shortness of Air.     • rOPINIRole (REQUIP) 1 MG tablet Take 2 mg by mouth Every Night.       No current facility-administered medications on file prior to visit.       Objective   /68 (BP Location: Left arm, Patient Position: Sitting, Cuff Size: Large Adult)   Pulse 79   Temp 98 °F (36.7 °C) (Oral)   Resp 20   Ht 167.6 cm (66\")   Wt 88.5 kg (195 lb)   BMI 31.47 kg/m²     General:   Appearance: appears stated age and healthy    Orientation: AAOx3    Affect: appropriate       VASCULAR       Left Foot Vascularity   Normal vascular exam    Dorsalis pedis:  2+  Posterior tibial:  2+  Skin " Temperature: warm    Edema Grading:  None  CFT:  < 3 seconds  Pedal Hair Growth:  Present  Varicosities: none        NEUROLOGIC      Left Foot Neurologic   Light touch sensation:  Normal  Hot/cold sensation: normal    Achilles reflex:  2+      MUSCULOSKELETAL       Left Foot Musculoskeletal   Ecchymosis:  None  Tenderness: toe 3        MUSCLE STRENGTH      Left Foot Muscle Strength   Normal strength    Foot dorsiflexion:  5  Foot plantar flexion:  5  Foot inversion:  5  Foot eversion:  5    Assessment/Plan   Diagnoses and all orders for this visit:    1. Laceration of lesser toe without foreign body without damage to nail, left, initial encounter (Primary)      Patient states that the toe has remained stable.  She denies any drainage or other concerning features.  Sutures were removed today without complication.  I have asked that she continue to monitor the toe closely and call with any concerns.  I would like her to return to her regular shoe and normal activity.  She is to return in 4 weeks for reevaluation.  Greater than 20 minutes was spent before, during, and after evaluation for patient care    No orders of the defined types were placed in this encounter.         Note is dictated utilizing voice recognition software. Unfortunately this leads to occasional typographical errors. I apologize in advance if the situation occurs. If questions occur please do not hesitate to call our office.

## 2022-04-04 ENCOUNTER — HOSPITAL ENCOUNTER (EMERGENCY)
Facility: HOSPITAL | Age: 72
Discharge: HOME | End: 2022-04-05
Attending: EMERGENCY MEDICINE | Admitting: EMERGENCY MEDICINE

## 2022-04-04 ENCOUNTER — APPOINTMENT (OUTPATIENT)
Dept: GENERAL RADIOLOGY | Facility: HOSPITAL | Age: 72
End: 2022-04-04

## 2022-04-04 DIAGNOSIS — R19.7 NAUSEA VOMITING AND DIARRHEA: ICD-10-CM

## 2022-04-04 DIAGNOSIS — R11.2 NAUSEA VOMITING AND DIARRHEA: ICD-10-CM

## 2022-04-04 DIAGNOSIS — U07.1 COVID-19: Primary | ICD-10-CM

## 2022-04-04 LAB
BASOPHILS # BLD AUTO: 0.1 10*3/MM3 (ref 0–0.2)
BASOPHILS NFR BLD AUTO: 1.1 % (ref 0–1.5)
D-LACTATE SERPL-SCNC: 2.1 MMOL/L (ref 0.5–2)
DEPRECATED RDW RBC AUTO: 47.3 FL (ref 37–54)
EOSINOPHIL # BLD AUTO: 0.2 10*3/MM3 (ref 0–0.4)
EOSINOPHIL NFR BLD AUTO: 3.2 % (ref 0.3–6.2)
ERYTHROCYTE [DISTWIDTH] IN BLOOD BY AUTOMATED COUNT: 14.1 % (ref 12.3–15.4)
HCT VFR BLD AUTO: 38.9 % (ref 34–46.6)
HGB BLD-MCNC: 13.4 G/DL (ref 12–15.9)
LYMPHOCYTES # BLD AUTO: 1.4 10*3/MM3 (ref 0.7–3.1)
LYMPHOCYTES NFR BLD AUTO: 30.6 % (ref 19.6–45.3)
MCH RBC QN AUTO: 33.1 PG (ref 26.6–33)
MCHC RBC AUTO-ENTMCNC: 34.5 G/DL (ref 31.5–35.7)
MCV RBC AUTO: 96 FL (ref 79–97)
MONOCYTES # BLD AUTO: 0.3 10*3/MM3 (ref 0.1–0.9)
MONOCYTES NFR BLD AUTO: 6.4 % (ref 5–12)
NEUTROPHILS NFR BLD AUTO: 2.7 10*3/MM3 (ref 1.7–7)
NEUTROPHILS NFR BLD AUTO: 58.7 % (ref 42.7–76)
NRBC BLD AUTO-RTO: 0.1 /100 WBC (ref 0–0.2)
PLATELET # BLD AUTO: 208 10*3/MM3 (ref 140–450)
PMV BLD AUTO: 7.7 FL (ref 6–12)
RBC # BLD AUTO: 4.05 10*6/MM3 (ref 3.77–5.28)
WBC NRBC COR # BLD: 4.6 10*3/MM3 (ref 3.4–10.8)
WHOLE BLOOD HOLD SPECIMEN: NORMAL

## 2022-04-04 PROCEDURE — 99283 EMERGENCY DEPT VISIT LOW MDM: CPT

## 2022-04-04 PROCEDURE — 36415 COLL VENOUS BLD VENIPUNCTURE: CPT

## 2022-04-04 PROCEDURE — 93005 ELECTROCARDIOGRAM TRACING: CPT | Performed by: EMERGENCY MEDICINE

## 2022-04-04 PROCEDURE — 83880 ASSAY OF NATRIURETIC PEPTIDE: CPT | Performed by: EMERGENCY MEDICINE

## 2022-04-04 PROCEDURE — 0202U NFCT DS 22 TRGT SARS-COV-2: CPT | Performed by: EMERGENCY MEDICINE

## 2022-04-04 PROCEDURE — 93005 ELECTROCARDIOGRAM TRACING: CPT

## 2022-04-04 PROCEDURE — 85379 FIBRIN DEGRADATION QUANT: CPT | Performed by: EMERGENCY MEDICINE

## 2022-04-04 PROCEDURE — 84484 ASSAY OF TROPONIN QUANT: CPT | Performed by: EMERGENCY MEDICINE

## 2022-04-04 PROCEDURE — 71045 X-RAY EXAM CHEST 1 VIEW: CPT

## 2022-04-04 PROCEDURE — 85025 COMPLETE CBC W/AUTO DIFF WBC: CPT | Performed by: EMERGENCY MEDICINE

## 2022-04-04 PROCEDURE — 87040 BLOOD CULTURE FOR BACTERIA: CPT | Performed by: EMERGENCY MEDICINE

## 2022-04-04 PROCEDURE — 84145 PROCALCITONIN (PCT): CPT | Performed by: EMERGENCY MEDICINE

## 2022-04-04 PROCEDURE — 80053 COMPREHEN METABOLIC PANEL: CPT | Performed by: EMERGENCY MEDICINE

## 2022-04-04 PROCEDURE — 83605 ASSAY OF LACTIC ACID: CPT

## 2022-04-04 RX ORDER — SODIUM CHLORIDE 0.9 % (FLUSH) 0.9 %
10 SYRINGE (ML) INJECTION AS NEEDED
Status: DISCONTINUED | OUTPATIENT
Start: 2022-04-04 | End: 2022-04-05 | Stop reason: HOSPADM

## 2022-04-05 ENCOUNTER — APPOINTMENT (OUTPATIENT)
Dept: CT IMAGING | Facility: HOSPITAL | Age: 72
End: 2022-04-05

## 2022-04-05 VITALS
WEIGHT: 195.11 LBS | HEART RATE: 88 BPM | SYSTOLIC BLOOD PRESSURE: 114 MMHG | TEMPERATURE: 98.3 F | DIASTOLIC BLOOD PRESSURE: 66 MMHG | OXYGEN SATURATION: 94 % | RESPIRATION RATE: 18 BRPM | BODY MASS INDEX: 31.36 KG/M2 | HEIGHT: 66 IN

## 2022-04-05 LAB
ALBUMIN SERPL-MCNC: 3.7 G/DL (ref 3.5–5.2)
ALBUMIN/GLOB SERPL: 1.2 G/DL
ALP SERPL-CCNC: 128 U/L (ref 39–117)
ALT SERPL W P-5'-P-CCNC: 23 U/L (ref 1–33)
ANION GAP SERPL CALCULATED.3IONS-SCNC: 12 MMOL/L (ref 5–15)
AST SERPL-CCNC: 29 U/L (ref 1–32)
B PARAPERT DNA SPEC QL NAA+PROBE: NOT DETECTED
B PERT DNA SPEC QL NAA+PROBE: NOT DETECTED
BILIRUB SERPL-MCNC: 0.3 MG/DL (ref 0–1.2)
BUN SERPL-MCNC: 15 MG/DL (ref 8–23)
BUN/CREAT SERPL: 15.3 (ref 7–25)
C PNEUM DNA NPH QL NAA+NON-PROBE: NOT DETECTED
CALCIUM SPEC-SCNC: 9.2 MG/DL (ref 8.6–10.5)
CHLORIDE SERPL-SCNC: 104 MMOL/L (ref 98–107)
CO2 SERPL-SCNC: 24 MMOL/L (ref 22–29)
CREAT SERPL-MCNC: 0.98 MG/DL (ref 0.57–1)
D DIMER PPP FEU-MCNC: 0.71 MG/L (FEU) (ref 0–0.59)
EGFRCR SERPLBLD CKD-EPI 2021: 61.1 ML/MIN/1.73
FLUAV SUBTYP SPEC NAA+PROBE: NOT DETECTED
FLUBV RNA ISLT QL NAA+PROBE: NOT DETECTED
GLOBULIN UR ELPH-MCNC: 3 GM/DL
GLUCOSE SERPL-MCNC: 107 MG/DL (ref 65–99)
HADV DNA SPEC NAA+PROBE: NOT DETECTED
HCOV 229E RNA SPEC QL NAA+PROBE: NOT DETECTED
HCOV HKU1 RNA SPEC QL NAA+PROBE: NOT DETECTED
HCOV NL63 RNA SPEC QL NAA+PROBE: NOT DETECTED
HCOV OC43 RNA SPEC QL NAA+PROBE: NOT DETECTED
HMPV RNA NPH QL NAA+NON-PROBE: NOT DETECTED
HOLD SPECIMEN: NORMAL
HOLD SPECIMEN: NORMAL
HPIV1 RNA ISLT QL NAA+PROBE: NOT DETECTED
HPIV2 RNA SPEC QL NAA+PROBE: NOT DETECTED
HPIV3 RNA NPH QL NAA+PROBE: NOT DETECTED
HPIV4 P GENE NPH QL NAA+PROBE: NOT DETECTED
M PNEUMO IGG SER IA-ACNC: NOT DETECTED
NT-PROBNP SERPL-MCNC: 165.8 PG/ML (ref 0–900)
POTASSIUM SERPL-SCNC: 4 MMOL/L (ref 3.5–5.2)
PROCALCITONIN SERPL-MCNC: 0.08 NG/ML (ref 0–0.25)
PROT SERPL-MCNC: 6.7 G/DL (ref 6–8.5)
RHINOVIRUS RNA SPEC NAA+PROBE: NOT DETECTED
RSV RNA NPH QL NAA+NON-PROBE: NOT DETECTED
SARS-COV-2 RNA NPH QL NAA+NON-PROBE: DETECTED
SODIUM SERPL-SCNC: 140 MMOL/L (ref 136–145)
TROPONIN T SERPL-MCNC: <0.01 NG/ML (ref 0–0.03)
WHOLE BLOOD HOLD SPECIMEN: NORMAL

## 2022-04-05 PROCEDURE — 25010000002 INJECTION, BEBTELOVIMAB, 175 MG: Performed by: EMERGENCY MEDICINE

## 2022-04-05 PROCEDURE — M0222 HC INJECTION BEBTELOVIMAB: HCPCS | Performed by: EMERGENCY MEDICINE

## 2022-04-05 PROCEDURE — 0 IOPAMIDOL PER 1 ML: Performed by: EMERGENCY MEDICINE

## 2022-04-05 PROCEDURE — 71275 CT ANGIOGRAPHY CHEST: CPT

## 2022-04-05 RX ORDER — DIPHENHYDRAMINE HYDROCHLORIDE 50 MG/ML
50 INJECTION INTRAMUSCULAR; INTRAVENOUS ONCE AS NEEDED
Status: DISCONTINUED | OUTPATIENT
Start: 2022-04-05 | End: 2022-04-05 | Stop reason: HOSPADM

## 2022-04-05 RX ORDER — EPINEPHRINE 1 MG/ML
0.3 INJECTION, SOLUTION, CONCENTRATE INTRAVENOUS ONCE AS NEEDED
Status: DISCONTINUED | OUTPATIENT
Start: 2022-04-05 | End: 2022-04-05 | Stop reason: HOSPADM

## 2022-04-05 RX ORDER — SODIUM CHLORIDE 9 MG/ML
30 INJECTION, SOLUTION INTRAVENOUS ONCE
Status: COMPLETED | OUTPATIENT
Start: 2022-04-05 | End: 2022-04-05

## 2022-04-05 RX ORDER — METHYLPREDNISOLONE SODIUM SUCCINATE 125 MG/2ML
125 INJECTION, POWDER, LYOPHILIZED, FOR SOLUTION INTRAMUSCULAR; INTRAVENOUS ONCE AS NEEDED
Status: DISCONTINUED | OUTPATIENT
Start: 2022-04-05 | End: 2022-04-05 | Stop reason: HOSPADM

## 2022-04-05 RX ORDER — DIPHENHYDRAMINE HCL 25 MG
50 CAPSULE ORAL ONCE AS NEEDED
Status: DISCONTINUED | OUTPATIENT
Start: 2022-04-05 | End: 2022-04-05 | Stop reason: HOSPADM

## 2022-04-05 RX ORDER — ONDANSETRON 4 MG/1
4 TABLET, FILM COATED ORAL 4 TIMES DAILY
Qty: 15 TABLET | Refills: 0 | Status: SHIPPED | OUTPATIENT
Start: 2022-04-05

## 2022-04-05 RX ORDER — BEBTELOVIMAB 87.5 MG/ML
175 INJECTION, SOLUTION INTRAVENOUS ONCE
Status: COMPLETED | OUTPATIENT
Start: 2022-04-05 | End: 2022-04-05

## 2022-04-05 RX ORDER — BENZONATATE 100 MG/1
100 CAPSULE ORAL 3 TIMES DAILY PRN
Qty: 30 CAPSULE | Refills: 0 | Status: SHIPPED | OUTPATIENT
Start: 2022-04-05

## 2022-04-05 RX ADMIN — SODIUM CHLORIDE 30 ML: 900 INJECTION INTRAVENOUS at 01:52

## 2022-04-05 RX ADMIN — Medication 10 ML: at 01:49

## 2022-04-05 RX ADMIN — BEBTELOVIMAB 175 MG: 87.5 INJECTION, SOLUTION INTRAVENOUS at 01:46

## 2022-04-05 RX ADMIN — IOPAMIDOL 100 ML: 755 INJECTION, SOLUTION INTRAVENOUS at 01:09

## 2022-04-05 NOTE — ED PROVIDER NOTES
Subjective   History of Present Illness  Context: 72-year-old female presents with complaint started feeling bad on Saturday.  She had just been at [place] for 10 days.  Reports airlines canceled all the flights and they had to rent a van to  drive home.  She has been using Tylenol, ibuprofen for aches.  She states she has vomited 8 times today diarrhea the same.  She started with some nausea vomit diarrhea yesterday.  She has not had fever that she is aware of but reports has been using Tylenol and ibuprofen.  She has done Covid test x2 both of which were negative.  She has been vaccinated and boosted.  She reports no urine difficulty.  She reports swelling in her legs and that her hands have been turning purple.  She does report cough and congestion.  Location: Chest  Quality: Dyspnea 2 days  Duration: 2 days  Timing: Constant  Severity: Moderate   Modifying factors: Had just been to [place] for 10 days  Associated signs and symptoms: Headache body aches cough congestion vomiting diarrhea sore throat leg edema    Review of Systems   Constitutional: Positive for chills. Negative for fever and unexpected weight change.   HENT: Positive for congestion and sore throat.    Eyes: Negative for pain and visual disturbance.   Respiratory: Positive for cough and shortness of breath.    Cardiovascular: Positive for leg swelling. Negative for chest pain.   Gastrointestinal: Positive for diarrhea, nausea and vomiting. Negative for abdominal pain.   Genitourinary: Negative for dysuria and urgency.   Musculoskeletal: Negative for back pain.   Skin: Negative for rash.   Neurological: Positive for headaches. Negative for dizziness and weakness.   Psychiatric/Behavioral: Negative for confusion.       Past Medical History:   Diagnosis Date   • Ankylosing spondylitis (CMS/[facility])    • Arthritis    • Asthma    • Connective tissue disease (CMS/[facility])    • GERD (gastroesophageal reflux disease)    • Hypothyroidism 2/13/2020   • Pulmonary  fibrosis (CMS/HCC)    • Raynaud's disease        Allergies   Allergen Reactions   • Bee Venom Anaphylaxis   • Thien Flavor Swelling   • Adhesive Tape Other (See Comments)     Sensitive   • Flexeril [Cyclobenzaprine] Other (See Comments)     Hypotension   • Metoclopramide Other (See Comments)     Tardive diskinesia  Tardive diskinesia  tardive dyskinesia     • Morphine Hallucinations   • Penicillins Other (See Comments)     Childhood   • Sulfa Antibiotics Other (See Comments)     Childhood       Past Surgical History:   Procedure Laterality Date   • APPENDECTOMY     • BARIATRIC SURGERY  2010   • BUNIONECTOMY     • CHOLECYSTECTOMY     • HAMMER TOE REPAIR Left 6/5/2020    Procedure: HAMMER TOE REPAIR third and fourth toe;  Surgeon: TRELL Pérez DPM;  Location: [facility] MAIN OR;  Service: Podiatry;  Laterality: Left;   • KNEE SURGERY Right 2009   • SHOULDER SURGERY Bilateral 2011 and 2012       Family History   Adopted: Yes   Family history unknown: Yes       Social History     Socioeconomic History   • Marital status:    Tobacco Use   • Smoking status: Never Smoker   • Smokeless tobacco: Never Used   Vaping Use   • Vaping Use: Never used   Substance and Sexual Activity   • Alcohol use: Yes   • Drug use: Never   • Sexual activity: Defer     Prior to Admission medications    Medication Sig Start Date End Date Taking? Authorizing Provider   alendronate (FOSAMAX) 70 MG tablet Take 70 mg by mouth Every 7 (Seven) Days. camillaes 2/18/19   Lexus Alvarado MD   ALPRAZolam (XANAX) 1 MG tablet Take 1 mg by mouth As Needed. 12/19/19   Lexus Alvarado MD   benzonatate (TESSALON) 100 MG capsule Take 100-200 mg by mouth. 7/18/21   Lexus Alvarado MD   benzonatate (TESSALON) 100 MG capsule TAKE 1 TO 2 CAPSULES BY MOUTH THREE TIMES DAILY AS NEEDED FOR COUGH 7/18/21   Lexus Alvarado MD   bimatoprost (LATISSE) 0.03 % ophthalmic solution PLACE 1 DROP ON APPLICATOR AND APPLY EVENLY ALONG THE SKIN OF THE UPPER  EYELID AT BASE OF EYELASHES AT BEDTIME; REPEAT FOR THE SECOND EYE. 6/25/20   Lexus Alvarado MD   BREO ELLIPTA 100-25 MCG/INH inhaler Inhale 1 puff Daily. 2/12/20   Lexus Alvarado MD   celecoxib (CeleBREX) 200 MG capsule Take 200 mg by mouth Daily. Hold 7 days prior to surgery 2/14/19   Lexus Alvarado MD   Cholecalciferol (VITAMIN D3) 1.25 MG (23749 UT) capsule Take 50,000 Units by mouth 3 (Three) Times a Week. 1/15/20   Lexus Alvarado MD   Cholecalciferol (Vitamin D3) 1.25 MG (47709 UT) capsule Take 50,000 Units by mouth Daily.    Lexus Alvarado MD   cyanocobalamin 1000 MCG/ML injection Inject 1,000 mcg into the appropriate muscle as directed by prescriber Every 14 (Fourteen) Days. 1/14/20   Lexus Alvarado MD   doxycycline (VIBRAMYCIN) 100 MG capsule  6/15/20   Lexus Alvarado MD   DULoxetine (CYMBALTA) 60 MG capsule Take 60 mg by mouth Daily. 8/19/18   Lexus Alvarado MD   furosemide (LASIX) 80 MG tablet Take 80 mg by mouth Daily. Hold DOS 2/29/16   Leuxs Alvarado MD   HYDROcodone-acetaminophen (NORCO) 5-325 MG per tablet Take 1 tablet by mouth Every 6 (Six) Hours As Needed for Moderate Pain . 4/1/21   Matthew Amador PA   HYDROcodone-acetaminophen (NORCO) 5-325 MG per tablet Take 1 tablet by mouth Every 6 (Six) Hours As Needed for Moderate Pain . 7/26/21   Luciana Frank PA   HYDROcodone-acetaminophen (NORCO) 7.5-325 MG per tablet Take 1 tablet by mouth Every 6 (Six) Hours As Needed for Moderate Pain  (Pain). 6/5/20   TRELL Pérez DPM   levothyroxine (SYNTHROID, LEVOTHROID) 112 MCG tablet Take 112 mcg by mouth Daily. 7/28/19   Lexus Alvarado MD   methocarbamol (ROBAXIN) 500 MG tablet Take 1 tablet by mouth 4 (Four) Times a Day. 6/15/20   Abbey Taveras APRN   NIFEdipine XL (PROCARDIA XL) 30 MG 24 hr tablet Take 30 mg by mouth Daily. 6 months out of the year Oct through April 7/29/19   Provider, MD Lexus   omeprazole (priLOSEC)  20 MG capsule Take 20 mg by mouth Daily. 7/29/19   Lexus Alvarado MD   potassium chloride (K-DUR,KLOR-CON) 20 MEQ CR tablet TAKE 1 TABLET BY MOUTH TWICE DAILY 7/11/19   Lexus Alvarado MD   PROAIR RESPICLICK 108 (90 Base) MCG/ACT inhaler Inhale 2 puffs Every 4 (Four) Hours As Needed for Shortness of Air. 2/12/20   Lexus Alvarado MD   rOPINIRole (REQUIP) 1 MG tablet Take 2 mg by mouth Every Night. 2/29/16   Lexus Alvarado MD           Objective   Physical Exam  72-year-old female awake alert.  Generally well-developed well-nourished.  She is noted to have a cough.  Pupils equal round react light.  TMs clear oropharynx mucous membranes moist neck supple chest clear cardiovascular regular rate and rhythm.  Abdomen soft nontender.  Extremities 2+ symmetric edema.  Neurologically without focal findings noted.  Skin without rash noted.  Psychiatric cooperative  Procedures           ED Course      Results for orders placed or performed during the hospital encounter of 04/04/22   Respiratory Panel PCR w/COVID-19(SARS-CoV-2) BALJINDER/NATALI/YUDI/PAD/COR/MAD/MIGUEL In-House, NP Swab in UTM/VTM, 3-4 HR TAT - Swab, Nasopharynx    Specimen: Nasopharynx; Swab   Result Value Ref Range    ADENOVIRUS, PCR Not Detected Not Detected    Coronavirus 229E Not Detected Not Detected    Coronavirus HKU1 Not Detected Not Detected    Coronavirus NL63 Not Detected Not Detected    Coronavirus OC43 Not Detected Not Detected    COVID19 Detected (C) Not Detected - Ref. Range    Human Metapneumovirus Not Detected Not Detected    Human Rhinovirus/Enterovirus Not Detected Not Detected    Influenza A PCR Not Detected Not Detected    Influenza B PCR Not Detected Not Detected    Parainfluenza Virus 1 Not Detected Not Detected    Parainfluenza Virus 2 Not Detected Not Detected    Parainfluenza Virus 3 Not Detected Not Detected    Parainfluenza Virus 4 Not Detected Not Detected    RSV, PCR Not Detected Not Detected    Bordetella pertussis  pcr Not Detected Not Detected    Bordetella parapertussis PCR Not Detected Not Detected    Chlamydophila pneumoniae PCR Not Detected Not Detected    Mycoplasma pneumo by PCR Not Detected Not Detected   Comprehensive Metabolic Panel    Specimen: Blood   Result Value Ref Range    Glucose 107 (H) 65 - 99 mg/dL    BUN 15 8 - 23 mg/dL    Creatinine 0.98 0.57 - 1.00 mg/dL    Sodium 140 136 - 145 mmol/L    Potassium 4.0 3.5 - 5.2 mmol/L    Chloride 104 98 - 107 mmol/L    CO2 24.0 22.0 - 29.0 mmol/L    Calcium 9.2 8.6 - 10.5 mg/dL    Total Protein 6.7 6.0 - 8.5 g/dL    Albumin 3.70 3.50 - 5.20 g/dL    ALT (SGPT) 23 1 - 33 U/L    AST (SGOT) 29 1 - 32 U/L    Alkaline Phosphatase 128 (H) 39 - 117 U/L    Total Bilirubin 0.3 0.0 - 1.2 mg/dL    Globulin 3.0 gm/dL    A/G Ratio 1.2 g/dL    BUN/Creatinine Ratio 15.3 7.0 - 25.0    Anion Gap 12.0 5.0 - 15.0 mmol/L    eGFR 61.1 >60.0 mL/min/1.73   Procalcitonin    Specimen: Blood   Result Value Ref Range    Procalcitonin 0.08 0.00 - 0.25 ng/mL   CBC Auto Differential    Specimen: Blood   Result Value Ref Range    WBC 4.60 3.40 - 10.80 10*3/mm3    RBC 4.05 3.77 - 5.28 10*6/mm3    Hemoglobin 13.4 12.0 - 15.9 g/dL    Hematocrit 38.9 34.0 - 46.6 %    MCV 96.0 79.0 - 97.0 fL    MCH 33.1 (H) 26.6 - 33.0 pg    MCHC 34.5 31.5 - 35.7 g/dL    RDW 14.1 12.3 - 15.4 %    RDW-SD 47.3 37.0 - 54.0 fl    MPV 7.7 6.0 - 12.0 fL    Platelets 208 140 - 450 10*3/mm3    Neutrophil % 58.7 42.7 - 76.0 %    Lymphocyte % 30.6 19.6 - 45.3 %    Monocyte % 6.4 5.0 - 12.0 %    Eosinophil % 3.2 0.3 - 6.2 %    Basophil % 1.1 0.0 - 1.5 %    Neutrophils, Absolute 2.70 1.70 - 7.00 10*3/mm3    Lymphocytes, Absolute 1.40 0.70 - 3.10 10*3/mm3    Monocytes, Absolute 0.30 0.10 - 0.90 10*3/mm3    Eosinophils, Absolute 0.20 0.00 - 0.40 10*3/mm3    Basophils, Absolute 0.10 0.00 - 0.20 10*3/mm3    nRBC 0.1 0.0 - 0.2 /100 WBC   BNP    Specimen: Blood   Result Value Ref Range    proBNP 165.8 0.0 - 900.0 pg/mL   Troponin     Specimen: Blood   Result Value Ref Range    Troponin T <0.010 0.000 - 0.030 ng/mL   D-dimer, Quantitative    Specimen: Blood   Result Value Ref Range    D-Dimer, Quantitative 0.71 (H) 0.00 - 0.59 mg/L (FEU)   POC Lactate    Specimen: Blood   Result Value Ref Range    Lactate 2.1 (C) 0.5 - 2.0 mmol/L   ECG 12 Lead   Result Value Ref Range    QT Interval 390 ms   Green Top (Gel)   Result Value Ref Range    Extra Tube      Lavender Top   Result Value Ref Range    Extra Tube     Gold Top - SST   Result Value Ref Range    Extra Tube Hold for add-ons.    Light Blue Top   Result Value Ref Range    Extra Tube hold for add-on      XR Chest 1 View    Result Date: 4/5/2022  No active disease. Moderate-sized hiatal hernia. Electronically signed by:  Nii Dobson M.D.  4/4/2022 11:36 PM    CT Chest Pulmonary Embolism    Result Date: 4/5/2022  1. No pulmonary embolus. 2. There are postsurgical findings from an adjustable gastric band placement. There is a moderate sized hernia above the band and the esophagus is prominently but chronically distended with food. 3. Chronic right middle lobe airspace disease/atelectasis that could be atelectasis or pneumonia. Electronically signed by:  Aramis Yoder M.D.  4/4/2022 11:36 PM    Medications   sodium chloride 0.9 % flush 10 mL (has no administration in time range)   bebtelovimab injection 175 mg (has no administration in time range)   EPINEPHrine PF (ADRENALIN) injection 0.3 mg (has no administration in time range)   diphenhydrAMINE (BENADRYL) capsule 50 mg (has no administration in time range)     Or   diphenhydrAMINE (BENADRYL) injection 50 mg (has no administration in time range)   methylPREDNISolone sodium succinate (SOLU-Medrol) injection 125 mg (has no administration in time range)   sodium chloride 0.9 % infusion 30 mL (has no administration in time range)   iopamidol (ISOVUE-370) 76 % injection 100 mL (100 mL Intravenous Given 4/5/22 0109)     /59   Pulse 86    "Temp 98.5 °F (36.9 °C) (Tympanic)   Resp 24   Ht 167.6 cm (66\")   Wt 88.5 kg (195 lb 1.7 oz)   SpO2 97%   BMI 31.49 kg/m²                                              [facility]  Chart review: Patient has had recent annual physical exam last month for peripheral edema generalized anxiety disorder restless leg arthritis B12 deficiency  Comorbidity: As per past history  Differential: Pneumonia, viral illness, congestive heart failure, pulmonary embolus, gastroenteritis procalcitonin 0.08 troponin negative D-dimer mildly elevated 0.71 lactic acid 2.1 proBNP normal 165 CBC white count 6.5 with normal differential  My EKG interpretation: Sinus rhythm nonspecific intraventricular conduction delay left bundle branch block type left anterior hemiblock.  Rate of 76 compared to previous no significant change  Lab: Comprehensive metabolic panel essentially normal glucose 107 alkaline phosphatase 128 procalcitonin 0.08, respiratory virus panel positive for COVID-19  Radiology: I reviewed chest x-ray no acute cardiopulmonary abnormality noted.  I reviewed CT chest PE protocol there is no pulmonary embolus.  There is postsurgical changes of gastric band.  There is moderate sized hiatal hernia and esophagus is prominently and chronically distended with food.  There is chronic right middle lobe airspace disease unchanged from previous.  Discussion/treatment: Patient was noted to have had CT chest PE protocol May of last year.  There is no evidence of thoracic aortic aneurysm.  There was no to be a laparoscopic band around the GE junction with moderate dilation of esophagus with fluid and debris within.  Patient's findings were discussed with her.  She is positive for COVID.  She would be considered high risk.  Antibody infusion was discussed with her.  After discussion she wanted to proceed.  She will receive Bebtelovimab.  Patient's findings were discussed with her.  She was discharged advised to obtain pulse oximeter to monitor her " oxygen at home.  Small sips of fluids.  She was given Zofran for nausea.  She was given Tessalon Perles.  To remain quarantined.  To follow-up with her prime provider return new or worsening symptoms  Patient was evaluated using appropriate PPE      Final diagnoses:   COVID-19   Nausea vomiting and diarrhea       ED Disposition  ED Disposition     ED Disposition   Discharge    Condition   Stable    Comment   --             Kenyatta Talavera, APRN  [address] [place] [address] [phone]               Medication List      New Prescriptions    ondansetron 4 MG tablet  Commonly known as: Zofran  Take 1 tablet by mouth 4 (Four) Times a Day. Prn nausea        Changed    * benzonatate 100 MG capsule  Commonly known as: TESSALON  What changed: Another medication with the same name was added. Make sure you understand how and when to take each.     * benzonatate 100 MG capsule  Commonly known as: TESSALON  What changed: Another medication with the same name was added. Make sure you understand how and when to take each.     * benzonatate 100 MG capsule  Commonly known as: TESSALON  Take 1 capsule by mouth 3 (Three) Times a Day As Needed for Cough.  What changed: You were already taking a medication with the same name, and this prescription was added. Make sure you understand how and when to take each.         * This list has 3 medication(s) that are the same as other medications prescribed for you. Read the directions carefully, and ask your doctor or other care provider to review them with you.               Where to Get Your Medications      These medications were sent to [id number] - [place], [place] - [address] AT [address] - [phone]  - [phone] FX  [address] [place] [place] [address]    Phone: [phone]   · benzonatate 100 MG capsule  · ondansetron 4 MG tablet          Zoltan Calvo MD  04/05/22 0144

## 2022-04-05 NOTE — DISCHARGE INSTRUCTIONS
Remain quarantine x10 days, obtain pulse oximeter to monitor oxygen at home.  Small sips of fluids.  May use Tylenol, ibuprofen for aches pain or fever, return new or worsening symptoms or if oxygen level starts to run below 90%.

## 2022-04-10 LAB
BACTERIA SPEC AEROBE CULT: NORMAL
BACTERIA SPEC AEROBE CULT: NORMAL

## 2022-04-12 LAB — QT INTERVAL: 390 MS

## 2022-07-24 ENCOUNTER — APPOINTMENT (OUTPATIENT)
Dept: GENERAL RADIOLOGY | Facility: HOSPITAL | Age: 72
End: 2022-07-24

## 2022-07-24 ENCOUNTER — HOSPITAL ENCOUNTER (EMERGENCY)
Facility: HOSPITAL | Age: 72
Discharge: HOME | End: 2022-07-24
Attending: EMERGENCY MEDICINE | Admitting: EMERGENCY MEDICINE

## 2022-07-24 VITALS
OXYGEN SATURATION: 97 % | WEIGHT: 182 LBS | BODY MASS INDEX: 29.25 KG/M2 | HEART RATE: 80 BPM | DIASTOLIC BLOOD PRESSURE: 65 MMHG | TEMPERATURE: 97.8 F | HEIGHT: 66 IN | RESPIRATION RATE: 17 BRPM | SYSTOLIC BLOOD PRESSURE: 121 MMHG

## 2022-07-24 DIAGNOSIS — S86.912A KNEE STRAIN, LEFT, INITIAL ENCOUNTER: ICD-10-CM

## 2022-07-24 DIAGNOSIS — M25.562 ACUTE PAIN OF LEFT KNEE: Primary | ICD-10-CM
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
## 2022-07-24 PROCEDURE — 73562 X-RAY EXAM OF KNEE 3: CPT

## 2022-07-24 PROCEDURE — 99283 EMERGENCY DEPT VISIT LOW MDM: CPT

## 2022-07-24 RX ORDER — ACETAMINOPHEN 500 MG
1000 TABLET ORAL ONCE
Status: COMPLETED | OUTPATIENT
Start: 2022-07-24 | End: 2022-07-24

## 2022-07-24 RX ADMIN — ACETAMINOPHEN 1000 MG: 500 TABLET ORAL at 20:00

## 2022-07-24 NOTE — ED PROVIDER NOTES
Subjective       Patient is a 72-year-old female comes in complaining of left knee pain and injury after mechanical fall about 2 hours prior to arrival earlier today.  Patient states that she was at the movie theater[place] when she fell and hit her right knee while walking up the stairs at the movie theater after someone else had stuck their foot out in the aisle where she was walking causing her to fall.  Patient states she landed on both her knees but mainly her left knee.  Patient states her pain currently is about a 9 out of 10 that is worse with movement and standing upright and better with rest.  Patient states her knee joint appears somewhat swollen compared to the right knee.  Patient denies any head injury or any other injury.  Patient states when she bends her knee she will have pain down her mid left tib-fib as well as pain that will shoot up her leg.  Patient reports some tingling distal to injury.  Patient states she follows with a podiatrist but does not recently follow with orthopedic surgeon.                Review of Systems   Constitutional: Negative for chills, fatigue and fever.   HENT: Negative for congestion, sore throat, tinnitus and trouble swallowing.    Eyes: Negative for photophobia, discharge and visual disturbance.   Respiratory: Negative for cough and shortness of breath.    Cardiovascular: Negative for chest pain and leg swelling.   Gastrointestinal: Negative for abdominal pain, diarrhea, nausea and vomiting.   Genitourinary: Negative for dysuria, flank pain and urgency.   Musculoskeletal: Negative for arthralgias and myalgias.        Left knee pain and swelling   Skin: Negative for rash.   Neurological: Negative for dizziness, syncope, light-headedness and headaches.   Psychiatric/Behavioral: Negative for confusion.       Past Medical History:   Diagnosis Date   • Ankylosing spondylitis (CMS/[facility])    • Arthritis    • Asthma    • Connective tissue disease (CMS/[facility])    • GERD  (gastroesophageal reflux disease)    • Hypothyroidism 2/13/2020   • Pulmonary fibrosis (CMS/HCC)    • Raynaud's disease        Allergies   Allergen Reactions   • Bee Venom Anaphylaxis   • [place] Flavor Swelling   • Adhesive Tape Other (See Comments)     Sensitive   • Flexeril [Cyclobenzaprine] Other (See Comments)     Hypotension   • Metoclopramide Other (See Comments)     Tardive diskinesia  Tardive diskinesia  tardive dyskinesia     • Morphine Hallucinations   • Penicillins Other (See Comments)     Childhood   • Sulfa Antibiotics Other (See Comments)     Childhood       Past Surgical History:   Procedure Laterality Date   • APPENDECTOMY     • BARIATRIC SURGERY  2010   • BUNIONECTOMY     • CHOLECYSTECTOMY     • HAMMER TOE REPAIR Left 6/5/2020    Procedure: HAMMER TOE REPAIR third and fourth toe;  Surgeon: TRELL Pérez DPM;  Location: [facility] MAIN OR;  Service: Podiatry;  Laterality: Left;   • KNEE SURGERY Right 2009   • SHOULDER SURGERY Bilateral 2011 and 2012       Family History   Adopted: Yes   Family history unknown: Yes       Social History     Socioeconomic History   • Marital status:    Tobacco Use   • Smoking status: Never Smoker   • Smokeless tobacco: Never Used   Vaping Use   • Vaping Use: Never used   Substance and Sexual Activity   • Alcohol use: Yes   • Drug use: Never   • Sexual activity: Defer           Objective   Physical Exam  Vitals and nursing note reviewed.   Constitutional:       General: She is not in acute distress.     Appearance: She is well-developed. She is not diaphoretic.   HENT:      Head: Normocephalic and atraumatic.      Right Ear: External ear normal.      Left Ear: External ear normal.      Nose: Nose normal.      Mouth/Throat:      Pharynx: No oropharyngeal exudate.   Eyes:      Extraocular Movements: Extraocular movements intact.      Conjunctiva/sclera: Conjunctivae normal.      Pupils: Pupils are equal, round, and reactive to light.   Cardiovascular:      Rate and  "Rhythm: Normal rate and regular rhythm.      Pulses: Normal pulses.      Heart sounds: Normal heart sounds.      Comments: S1, S2 audible.  Pulmonary:      Effort: Pulmonary effort is normal. No respiratory distress.      Breath sounds: Normal breath sounds. No wheezing, rhonchi or rales.      Comments: On room air.  Abdominal:      General: Bowel sounds are normal. There is no distension.      Palpations: Abdomen is soft.      Tenderness: There is no abdominal tenderness. There is no guarding or rebound.   Musculoskeletal:         General: Swelling, tenderness and signs of injury present. No deformity. Normal range of motion.      Cervical back: Normal range of motion.      Right lower leg: No edema.      Left lower leg: No edema.      Comments: Patient has mild tenderness of her proximal left tibia however no tenderness of her left fibula.  Patient has mild tenderness of her left patella.  No femoral tenderness or left hip tenderness.  Patient has good active range of motion of left knee despite pain.  Patient has great range of motion of the left ankle and left hip.  Patient pedal pulses intact bilaterally.   Skin:     General: Skin is warm.      Capillary Refill: Capillary refill takes less than 2 seconds.      Findings: No erythema or rash.   Neurological:      Mental Status: She is alert and oriented to person, place, and time.      Cranial Nerves: No cranial nerve deficit.   Psychiatric:         Mood and Affect: Mood normal.         Behavior: Behavior normal.         Procedures           ED Course  ED Course as of 07/25/22 0252   Sun Jul 24, 2022 2125 X-ray left knee reviewed by myself and interpreted by Dr. Royer West, shows no acute osseous finding. [RL]      ED Course User Index  [RL] Arnie Jeffers PA      /65   Pulse 80   Temp 97.8 °F (36.6 °C) (Temporal)   Resp 17   Ht 167.6 cm (66\")   Wt 82.6 kg (182 lb)   SpO2 97%   BMI 29.38 kg/m²   Labs Reviewed - No data to display                   " "                      MDM     Chart review: Allergies reviewed.   EKG:  Not indicated    Imaging: See above  Labs: Not indicated  Vitals:  /65   Pulse 80   Temp 97.8 °F (36.6 °C) (Temporal)   Resp 17   Ht 167.6 cm (66\")   Wt 82.6 kg (182 lb)   SpO2 97%   BMI 29.38 kg/m²     Medications given:    Medications   acetaminophen (TYLENOL) tablet 1,000 mg (1,000 mg Oral Given 7/24/22 2000)       Procedures:    MDM: Patient is a 72-year-old female comes in complaining of the left knee pain after mechanical fall.  Patient was given Tylenol for pain control.  X-ray left knee shows no acute osseous findings.  Patient was fit with an Ace wrap of left knee.  Patient was neurovascularly intact distal to injury before and after Ace wrap was placed.  Patient was recommended to use a walker and stated that she already had one at home and would not like to be fit with one at this time.  Patient was given strict return precautions and voiced understanding.  Patient discharged follow-up with orthopedic surgery in 1 week's time symptoms persist and patient voiced understanding. See full discharge instructions for further details.  Results and plan discussed with patient and is agreeable with plan.    Final diagnoses:   Acute pain of left knee   Fall, initial encounter   Knee strain, left, initial encounter       ED Disposition  ED Disposition     ED Disposition   Discharge    Condition   Stable    Comment   --             [facility] EMERGENCY DEPARTMENT  [address] [facility] [address] [phone]  Go in 1 day  As needed, If symptoms worsen    Kenyatta Talavera, APRN  [address] [facility] [place] [id number] [phone]    Schedule an appointment as soon as possible for a visit in 1 week  As needed    Parrish Garber II, MD  [address] [place] [address] [phone]    Schedule an appointment as soon as possible for a visit in 1 week  for orthopedic follow up       "   Medication List      No changes were made to your prescriptions during this visit.          Arnie Jeffers PA  07/25/22 0250

## 2022-07-25 NOTE — DISCHARGE INSTRUCTIONS
Take Tylenol for pain control as needed.  Rest, compress and elevate extremity as tolerated.  Recommend ice to injury 20 minutes on, 20 minutes off for a total of an hour at a time daily.  Can use Ace wrap or towel rather than placing ice directly on skin. Use walker as instructed and refrain from weightbearing. Follow up with orthopedic surgery or primary care provider in 7-10 days if pain/swelling persists.

## 2023-02-07 ENCOUNTER — HOSPITAL ENCOUNTER (EMERGENCY)
Facility: HOSPITAL | Age: 73
Discharge: LEFT BEFORE DISCHARGE | End: 2023-02-07
Attending: EMERGENCY MEDICINE | Admitting: EMERGENCY MEDICINE
Payer: MEDICARE

## 2023-02-07 ENCOUNTER — APPOINTMENT (OUTPATIENT)
Dept: GENERAL RADIOLOGY | Facility: HOSPITAL | Age: 73
End: 2023-02-07
Payer: MEDICARE

## 2023-02-07 VITALS
TEMPERATURE: 98.8 F | WEIGHT: 182 LBS | OXYGEN SATURATION: 99 % | HEIGHT: 66 IN | DIASTOLIC BLOOD PRESSURE: 74 MMHG | RESPIRATION RATE: 20 BRPM | SYSTOLIC BLOOD PRESSURE: 126 MMHG | BODY MASS INDEX: 29.25 KG/M2 | HEART RATE: 80 BPM

## 2023-02-07 DIAGNOSIS — R07.9 CHEST PAIN, UNSPECIFIED TYPE: Primary | ICD-10-CM

## 2023-02-07 LAB
ALBUMIN SERPL-MCNC: 5.2 G/DL (ref 3.5–5.2)
ALBUMIN/GLOB SERPL: 1.7 G/DL
ALP SERPL-CCNC: 138 U/L (ref 39–117)
ALT SERPL W P-5'-P-CCNC: 32 U/L (ref 1–33)
ANION GAP SERPL CALCULATED.3IONS-SCNC: 13.9 MMOL/L (ref 5–15)
AST SERPL-CCNC: 34 U/L (ref 1–32)
BASOPHILS # BLD AUTO: 0.02 10*3/MM3 (ref 0–0.2)
BASOPHILS NFR BLD AUTO: 0.3 % (ref 0–1.5)
BILIRUB SERPL-MCNC: 0.7 MG/DL (ref 0–1.2)
BUN SERPL-MCNC: 15 MG/DL (ref 8–23)
BUN/CREAT SERPL: 19 (ref 7–25)
CALCIUM SPEC-SCNC: 10.3 MG/DL (ref 8.6–10.5)
CHLORIDE SERPL-SCNC: 100 MMOL/L (ref 98–107)
CO2 SERPL-SCNC: 26.1 MMOL/L (ref 22–29)
CREAT SERPL-MCNC: 0.79 MG/DL (ref 0.57–1)
DEPRECATED RDW RBC AUTO: 49 FL (ref 37–54)
EGFRCR SERPLBLD CKD-EPI 2021: 79.1 ML/MIN/1.73
EOSINOPHIL # BLD AUTO: 0.1 10*3/MM3 (ref 0–0.4)
EOSINOPHIL NFR BLD AUTO: 1.4 % (ref 0.3–6.2)
ERYTHROCYTE [DISTWIDTH] IN BLOOD BY AUTOMATED COUNT: 13.8 % (ref 12.3–15.4)
FLUAV SUBTYP SPEC NAA+PROBE: NOT DETECTED
FLUBV RNA ISLT QL NAA+PROBE: NOT DETECTED
GLOBULIN UR ELPH-MCNC: 3.1 GM/DL
GLUCOSE SERPL-MCNC: 92 MG/DL (ref 65–99)
HCT VFR BLD AUTO: 49 % (ref 34–46.6)
HGB BLD-MCNC: 16.4 G/DL (ref 12–15.9)
IMM GRANULOCYTES # BLD AUTO: 0.01 10*3/MM3 (ref 0–0.05)
IMM GRANULOCYTES NFR BLD AUTO: 0.1 % (ref 0–0.5)
LYMPHOCYTES # BLD AUTO: 1.49 10*3/MM3 (ref 0.7–3.1)
LYMPHOCYTES NFR BLD AUTO: 20.1 % (ref 19.6–45.3)
MCH RBC QN AUTO: 32 PG (ref 26.6–33)
MCHC RBC AUTO-ENTMCNC: 33.5 G/DL (ref 31.5–35.7)
MCV RBC AUTO: 95.5 FL (ref 79–97)
MONOCYTES # BLD AUTO: 0.31 10*3/MM3 (ref 0.1–0.9)
MONOCYTES NFR BLD AUTO: 4.2 % (ref 5–12)
NEUTROPHILS NFR BLD AUTO: 5.47 10*3/MM3 (ref 1.7–7)
NEUTROPHILS NFR BLD AUTO: 73.9 % (ref 42.7–76)
NT-PROBNP SERPL-MCNC: 333.1 PG/ML (ref 0–900)
PLATELET # BLD AUTO: 271 10*3/MM3 (ref 140–450)
PMV BLD AUTO: 9.9 FL (ref 6–12)
POTASSIUM SERPL-SCNC: 3.6 MMOL/L (ref 3.5–5.2)
PROT SERPL-MCNC: 8.3 G/DL (ref 6–8.5)
RBC # BLD AUTO: 5.13 10*6/MM3 (ref 3.77–5.28)
SARS-COV-2 RNA RESP QL NAA+PROBE: NOT DETECTED
SODIUM SERPL-SCNC: 140 MMOL/L (ref 136–145)
STREP A PCR: NOT DETECTED
TROPONIN T SERPL HS-MCNC: 12 NG/L
WBC NRBC COR # BLD: 7.4 10*3/MM3 (ref 3.4–10.8)

## 2023-02-07 PROCEDURE — 80053 COMPREHEN METABOLIC PANEL: CPT | Performed by: EMERGENCY MEDICINE

## 2023-02-07 PROCEDURE — 84484 ASSAY OF TROPONIN QUANT: CPT | Performed by: EMERGENCY MEDICINE

## 2023-02-07 PROCEDURE — 36415 COLL VENOUS BLD VENIPUNCTURE: CPT

## 2023-02-07 PROCEDURE — 93005 ELECTROCARDIOGRAM TRACING: CPT | Performed by: EMERGENCY MEDICINE

## 2023-02-07 PROCEDURE — 99284 EMERGENCY DEPT VISIT MOD MDM: CPT

## 2023-02-07 PROCEDURE — 85025 COMPLETE CBC W/AUTO DIFF WBC: CPT | Performed by: EMERGENCY MEDICINE

## 2023-02-07 PROCEDURE — 71046 X-RAY EXAM CHEST 2 VIEWS: CPT

## 2023-02-07 PROCEDURE — 99282 EMERGENCY DEPT VISIT SF MDM: CPT

## 2023-02-07 PROCEDURE — 87651 STREP A DNA AMP PROBE: CPT | Performed by: EMERGENCY MEDICINE

## 2023-02-07 PROCEDURE — 83880 ASSAY OF NATRIURETIC PEPTIDE: CPT | Performed by: EMERGENCY MEDICINE

## 2023-02-07 PROCEDURE — 93010 ELECTROCARDIOGRAM REPORT: CPT | Performed by: EMERGENCY MEDICINE

## 2023-02-07 PROCEDURE — 87636 SARSCOV2 & INF A&B AMP PRB: CPT | Performed by: EMERGENCY MEDICINE

## 2023-02-07 RX ORDER — SODIUM CHLORIDE 0.9 % (FLUSH) 0.9 %
10 SYRINGE (ML) INJECTION AS NEEDED
Status: DISCONTINUED | OUTPATIENT
Start: 2023-02-07 | End: 2023-02-07
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
## 2023-02-08 NOTE — ED NOTES
Patient called out and requested to leave. She does not want to wait the 2 hours for Troponin re-check since she was such a hard stick earlier. She reports she will call her PCP tomorrow and f/u with labs. Patient verbalized understanding that she will return with any worsening symptoms. AMA form signed.

## 2023-02-10 LAB — QT INTERVAL: 391 MS

## 2023-04-13 ENCOUNTER — OFFICE VISIT (OUTPATIENT)
Dept: PODIATRY | Facility: CLINIC | Age: 73
End: 2023-04-13
Payer: MEDICARE

## 2023-04-13 VITALS — OXYGEN SATURATION: 87 % | HEIGHT: 66 IN | WEIGHT: 182 LBS | HEART RATE: 76 BPM | BODY MASS INDEX: 29.25 KG/M2

## 2023-04-13 DIAGNOSIS — M79.672 CHRONIC FOOT PAIN, LEFT: Primary | ICD-10-CM

## 2023-04-13 DIAGNOSIS — L85.2 PUNCTATE KERATOSIS: ICD-10-CM

## 2023-04-13 DIAGNOSIS — G89.29 CHRONIC FOOT PAIN, LEFT: Primary | ICD-10-CM

## 2023-04-13 PROCEDURE — 1159F MED LIST DOCD IN RCRD: CPT | Performed by: PODIATRIST

## 2023-04-13 PROCEDURE — 99213 OFFICE O/P EST LOW 20 MIN: CPT | Performed by: PODIATRIST

## 2023-04-13 PROCEDURE — 1160F RVW MEDS BY RX/DR IN RCRD: CPT | Performed by: PODIATRIST

## 2023-04-13 RX ORDER — MONTELUKAST SODIUM 10 MG/1
10 TABLET ORAL NIGHTLY
COMMUNITY
Start: 2023-04-07

## 2023-04-13 NOTE — PROGRESS NOTES
04/13/2023  Foot and Ankle Surgery - Established Patient/Follow-up  Provider: Dr. Steve Pérez DPM  Location: [facility]    Subjective:  Jo Prescott is a 73 y.o. female.     Chief Complaint   Patient presents with   • Left Foot - Edema, Pain, Callouses     X one month       HPI: The patient is a 73-year-old female who presents for follow-up regarding her left toe.    The patient reports a callus on her left great toe that is painful. She notes she has had 2 bunion surgeries in the past. The patient states she has been getting routine pedicures, using a pumice stone, and moisturizes her callus daily. She notes she has been applying moleskin to the callus. She reports she had a small kernel in the middle part of her foot once under her toes. She reports she has had issues with her veins all her life.    Allergies   Allergen Reactions   • Bee Venom Anaphylaxis   • [place]ien Flavor Swelling   • Adhesive Tape Other (See Comments)     Sensitive   • Flexeril [Cyclobenzaprine] Other (See Comments)     Hypotension   • Metoclopramide Other (See Comments)     Tardive diskinesia  Tardive diskinesia  tardive dyskinesia     • Morphine Hallucinations   • Penicillins Other (See Comments)     Childhood   • Sulfa Antibiotics Other (See Comments)     Childhood       Current Outpatient Medications on File Prior to Visit   Medication Sig Dispense Refill   • alendronate (FOSAMAX) 70 MG tablet Take 1 tablet by mouth Every 7 (Seven) Days. tues     • ALPRAZolam (XANAX) 1 MG tablet Take 1 tablet by mouth As Needed.     • benzonatate (TESSALON) 100 MG capsule Take 1-2 capsules by mouth.     • benzonatate (TESSALON) 100 MG capsule TAKE 1 TO 2 CAPSULES BY MOUTH THREE TIMES DAILY AS NEEDED FOR COUGH     • benzonatate (TESSALON) 100 MG capsule Take 1 capsule by mouth 3 (Three) Times a Day As Needed for Cough. 30 capsule 0   • bimatoprost (LATISSE) 0.03 % ophthalmic solution PLACE 1 DROP ON APPLICATOR AND APPLY EVENLY ALONG THE SKIN  OF THE UPPER EYELID AT BASE OF EYELASHES AT BEDTIME; REPEAT FOR THE SECOND EYE.     • BREO ELLIPTA 100-25 MCG/INH inhaler Inhale 1 puff Daily.     • celecoxib (CeleBREX) 200 MG capsule Take 1 capsule by mouth Daily. Hold 7 days prior to surgery     • Cholecalciferol (VITAMIN D3) 1.25 MG (98762 UT) capsule Take 1 capsule by mouth 3 (Three) Times a Week.     • Cholecalciferol (Vitamin D3) 1.25 MG (75522 UT) capsule Take 1 capsule by mouth Daily.     • cyanocobalamin 1000 MCG/ML injection Inject 1 mL into the appropriate muscle as directed by prescriber Every 14 (Fourteen) Days.     • DULoxetine (CYMBALTA) 60 MG capsule Take 1 capsule by mouth Daily.     • furosemide (LASIX) 80 MG tablet Take 1 tablet by mouth Daily. Hold DOS     • HYDROcodone-acetaminophen (NORCO) 7.5-325 MG per tablet Take 1 tablet by mouth Every 6 (Six) Hours As Needed for Moderate Pain  (Pain). 28 tablet 0   • levothyroxine (SYNTHROID, LEVOTHROID) 112 MCG tablet Take 1 tablet by mouth Daily.     • methocarbamol (ROBAXIN) 500 MG tablet Take 1 tablet by mouth 4 (Four) Times a Day. 12 tablet 0   • NIFEdipine XL (PROCARDIA XL) 30 MG 24 hr tablet Take 1 tablet by mouth Daily. 6 months out of the year Oct through April     • omeprazole (priLOSEC) 20 MG capsule Take 1 capsule by mouth Daily.     • ondansetron (Zofran) 4 MG tablet Take 1 tablet by mouth 4 (Four) Times a Day. Prn nausea 15 tablet 0   • potassium chloride (K-DUR,KLOR-CON) 20 MEQ CR tablet TAKE 1 TABLET BY MOUTH TWICE DAILY     • PROAIR RESPICLICK 108 (90 Base) MCG/ACT inhaler Inhale 2 puffs Every 4 (Four) Hours As Needed for Shortness of Air.     • rOPINIRole (REQUIP) 1 MG tablet Take 2 tablets by mouth Every Night.     • doxycycline (VIBRAMYCIN) 100 MG capsule  (Patient not taking: Reported on 4/13/2023)     • HYDROcodone-acetaminophen (NORCO) 5-325 MG per tablet Take 1 tablet by mouth Every 6 (Six) Hours As Needed for Moderate Pain . (Patient not taking: Reported on 4/13/2023) 12 tablet 0  "  • HYDROcodone-acetaminophen (NORCO) 5-325 MG per tablet Take 1 tablet by mouth Every 6 (Six) Hours As Needed for Moderate Pain . (Patient not taking: Reported on 4/13/2023) 12 tablet 0   • montelukast (SINGULAIR) 10 MG tablet Take 1 tablet by mouth Every Night.       No current facility-administered medications on file prior to visit.       Objective   Pulse 76   Ht 167.6 cm (66\")   Wt 82.6 kg (182 lb)   SpO2 (!) 87%   BMI 29.38 kg/m²     Foot/Ankle Exam    GENERAL  Orientation:  AAOx3  Affect:  appropriate    VASCULAR     Left Foot Vascularity   Normal vascular exam    Dorsalis pedis:  2+  Posterior tibial:  2+  Skin temperature:  warm  Edema grading:  None  CFT:  < 3 seconds  Pedal hair growth:  Present  Varicosities:  none     NEUROLOGIC     Left Foot Neurologic   Light touch sensation: normal  Hot/Cold sensation:  normal  Achilles reflex:  2+    MUSCULOSKELETAL     Left Foot Musculoskeletal   Tenderness:  toe 3 tenderness    MUSCLE STRENGTH     Left Foot Muscle Strength   Normal strength    Foot dorsiflexion:  5  Foot plantar flexion:  5  Foot inversion:  5  Foot eversion:  5     Left foot additional comments: 04/13/2023 No open wounds or signs of infection. Mild callus formation involving the plantar medial aspect of the left first metatarsophalangeal joint region. Small hyperkeratotic core consistent with a porokeratosis. No other progressive deformity or instability.    Image:       Assessment & Plan   Diagnoses and all orders for this visit:    1. Chronic foot pain, left (Primary)  -     Cancel: XR Foot 3+ View Left    2. Punctate keratosis      Patient returns for follow-up regarding her left great toe. I discussed with the patient that the skin is trying to protect itself and it basically starts to develop some callusing. I discussed with the patient that her feet change as we age. I discussed with the patient that her feet will progressively change and one of the arches that we have in the middle of " our foot, but one that kind of collapse a little bit. I recommend routine pedicures and moisturize her feet daily. I advised the patient to avoid going barefoot, flip flops, sandals, or flats. I discussed with the patient that the little bump is probably a small callus that has a cord to it. I recommended that she use salicylic acid under her mole skin everyday. I discussed with the patient that the callus will go away based on the size. I discussed with the patient that if it gets bigger, I have something that is about 10 times stronger than the over-the-counter acid. I recommend compression stockings. I recommend continuing to watch the area. I advise to call to be seen if needed    Greater than 20 minutes spent before, during, and after evaluation for patient care.    No orders of the defined types were placed in this encounter.         Note is dictated utilizing voice recognition software. Unfortunately this leads to occasional typographical errors. I apologize in advance if the situation occurs. If questions occur please do not hesitate to call our office.    Transcribed from ambient dictation for TRELL Pérez DPM by Opal Blakely.  04/13/23   11:04 EDT    Patient or patient representative verbalized consent to the visit recording.  I have personally performed the services described in this document as transcribed by the above individual, and it is both accurate and complete.

## 2023-05-22 ENCOUNTER — APPOINTMENT (OUTPATIENT)
Dept: GENERAL RADIOLOGY | Facility: HOSPITAL | Age: 73
End: 2023-05-22
Payer: MEDICARE

## 2023-05-22 ENCOUNTER — APPOINTMENT (OUTPATIENT)
Dept: CARDIOLOGY | Facility: HOSPITAL | Age: 73
End: 2023-05-22
Payer: MEDICARE

## 2023-05-22 ENCOUNTER — APPOINTMENT (OUTPATIENT)
Dept: CT IMAGING | Facility: HOSPITAL | Age: 73
End: 2023-05-22
Payer: MEDICARE

## 2023-05-22 ENCOUNTER — HOSPITAL ENCOUNTER (OUTPATIENT)
Facility: HOSPITAL | Age: 73
Setting detail: OBSERVATION
Discharge: HOME | End: 2023-05-23
Attending: EMERGENCY MEDICINE
Payer: MEDICARE

## 2023-05-22 DIAGNOSIS — R06.02 SHORTNESS OF BREATH: ICD-10-CM

## 2023-05-22 DIAGNOSIS — J18.9 MULTIFOCAL PNEUMONIA: Primary | ICD-10-CM

## 2023-05-22 PROBLEM — R60.9 PERIPHERAL EDEMA: Status: ACTIVE | Noted: 2023-05-22

## 2023-05-22 LAB
ALBUMIN SERPL-MCNC: 4.2 G/DL (ref 3.5–5.2)
ALBUMIN/GLOB SERPL: 1.4 G/DL
ALP SERPL-CCNC: 123 U/L (ref 39–117)
ALT SERPL W P-5'-P-CCNC: 19 U/L (ref 1–33)
ANION GAP SERPL CALCULATED.3IONS-SCNC: 14 MMOL/L (ref 5–15)
AST SERPL-CCNC: 24 U/L (ref 1–32)
B PARAPERT DNA SPEC QL NAA+PROBE: NOT DETECTED
B PERT DNA SPEC QL NAA+PROBE: NOT DETECTED
BASOPHILS # BLD AUTO: 0.1 10*3/MM3 (ref 0–0.2)
BASOPHILS NFR BLD AUTO: 0.8 % (ref 0–1.5)
BH CV LOW VAS LEFT COMMON FEMORAL SPONT: 1
BH CV LOW VAS LEFT DISTAL FEMORAL SPONT: 1
BH CV LOW VAS LEFT GASTRONEMIUS VESSEL: 1
BH CV LOW VAS LEFT MID FEMORAL SPONT: 1
BH CV LOW VAS LEFT POPLITEAL SPONT: 1
BH CV LOW VAS LEFT POSTERIOR TIBIAL VESSEL: 1
BH CV LOW VAS LEFT PROXIMAL FEMORAL SPONT: 1
BH CV LOW VAS LEFT SAPHENOFEMORAL JUNCTION SPONT: 1
BH CV LOW VAS RIGHT COMMON FEMORAL SPONT: 1
BH CV LOW VAS RIGHT SAPHENOFEMORAL JUNCTION SPONT: 1
BH CV LOWER VASCULAR LEFT COMMON FEMORAL AUGMENT: NORMAL
BH CV LOWER VASCULAR LEFT COMMON FEMORAL COMPETENT: NORMAL
BH CV LOWER VASCULAR LEFT COMMON FEMORAL COMPRESS: NORMAL
BH CV LOWER VASCULAR LEFT COMMON FEMORAL PHASIC: NORMAL
BH CV LOWER VASCULAR LEFT COMMON FEMORAL SPONT: NORMAL
BH CV LOWER VASCULAR LEFT COMMON FEMORAL THROMBUS: NORMAL
BH CV LOWER VASCULAR LEFT DISTAL FEMORAL AUGMENT: NORMAL
BH CV LOWER VASCULAR LEFT DISTAL FEMORAL COMPETENT: NORMAL
BH CV LOWER VASCULAR LEFT DISTAL FEMORAL COMPRESS: NORMAL
BH CV LOWER VASCULAR LEFT DISTAL FEMORAL PHASIC: NORMAL
BH CV LOWER VASCULAR LEFT DISTAL FEMORAL SPONT: NORMAL
BH CV LOWER VASCULAR LEFT DISTAL FEMORAL THROMBUS: NORMAL
BH CV LOWER VASCULAR LEFT GASTRONEMIUS COMPRESS: NORMAL
BH CV LOWER VASCULAR LEFT GASTRONEMIUS THROMBUS: NORMAL
BH CV LOWER VASCULAR LEFT GREATER SAPH AK COMPRESS: NORMAL
BH CV LOWER VASCULAR LEFT GREATER SAPH BK COMPRESS: NORMAL
BH CV LOWER VASCULAR LEFT LESSER SAPH COMPRESS: NORMAL
BH CV LOWER VASCULAR LEFT MID FEMORAL AUGMENT: NORMAL
BH CV LOWER VASCULAR LEFT MID FEMORAL COMPETENT: NORMAL
BH CV LOWER VASCULAR LEFT MID FEMORAL COMPRESS: NORMAL
BH CV LOWER VASCULAR LEFT MID FEMORAL PHASIC: NORMAL
BH CV LOWER VASCULAR LEFT MID FEMORAL SPONT: NORMAL
BH CV LOWER VASCULAR LEFT MID FEMORAL THROMBUS: NORMAL
BH CV LOWER VASCULAR LEFT POPLITEAL AUGMENT: NORMAL
BH CV LOWER VASCULAR LEFT POPLITEAL COMPETENT: NORMAL
BH CV LOWER VASCULAR LEFT POPLITEAL COMPRESS: NORMAL
BH CV LOWER VASCULAR LEFT POPLITEAL PHASIC: NORMAL
BH CV LOWER VASCULAR LEFT POPLITEAL SPONT: NORMAL
BH CV LOWER VASCULAR LEFT POPLITEAL THROMBUS: NORMAL
BH CV LOWER VASCULAR LEFT POSTERIOR TIBIAL COMPRESS: NORMAL
BH CV LOWER VASCULAR LEFT POSTERIOR TIBIAL THROMBUS: NORMAL
BH CV LOWER VASCULAR LEFT PROXIMAL FEMORAL AUGMENT: NORMAL
BH CV LOWER VASCULAR LEFT PROXIMAL FEMORAL COMPETENT: NORMAL
BH CV LOWER VASCULAR LEFT PROXIMAL FEMORAL COMPRESS: NORMAL
BH CV LOWER VASCULAR LEFT PROXIMAL FEMORAL PHASIC: NORMAL
BH CV LOWER VASCULAR LEFT PROXIMAL FEMORAL SPONT: NORMAL
BH CV LOWER VASCULAR LEFT PROXIMAL FEMORAL THROMBUS: NORMAL
BH CV LOWER VASCULAR LEFT SAPHENOFEMORAL JUNCTION COMPRESS: NORMAL
BH CV LOWER VASCULAR LEFT SAPHENOFEMORAL JUNCTION THROMBUS: NORMAL
BH CV LOWER VASCULAR RIGHT COMMON FEMORAL AUGMENT: NORMAL
BH CV LOWER VASCULAR RIGHT COMMON FEMORAL COMPETENT: NORMAL
BH CV LOWER VASCULAR RIGHT COMMON FEMORAL COMPRESS: NORMAL
BH CV LOWER VASCULAR RIGHT COMMON FEMORAL PHASIC: NORMAL
BH CV LOWER VASCULAR RIGHT COMMON FEMORAL SPONT: NORMAL
BH CV LOWER VASCULAR RIGHT COMMON FEMORAL THROMBUS: NORMAL
BH CV LOWER VASCULAR RIGHT SAPHENOFEMORAL JUNCTION COMPRESS: NORMAL
BH CV LOWER VASCULAR RIGHT SAPHENOFEMORAL JUNCTION THROMBUS: NORMAL
BH CV VAS PRELIMINARY FINDINGS SCRIPTING: 1
BILIRUB SERPL-MCNC: 0.5 MG/DL (ref 0–1.2)
BUN SERPL-MCNC: 29 MG/DL (ref 8–23)
BUN/CREAT SERPL: 33 (ref 7–25)
C PNEUM DNA NPH QL NAA+NON-PROBE: NOT DETECTED
CALCIUM SPEC-SCNC: 9.8 MG/DL (ref 8.6–10.5)
CHLORIDE SERPL-SCNC: 100 MMOL/L (ref 98–107)
CO2 SERPL-SCNC: 26 MMOL/L (ref 22–29)
CREAT SERPL-MCNC: 0.88 MG/DL (ref 0.57–1)
D DIMER PPP FEU-MCNC: 0.96 MG/L (FEU) (ref 0–0.74)
DEPRECATED RDW RBC AUTO: 51.6 FL (ref 37–54)
EGFRCR SERPLBLD CKD-EPI 2021: 69.1 ML/MIN/1.73
EOSINOPHIL # BLD AUTO: 0.1 10*3/MM3 (ref 0–0.4)
EOSINOPHIL NFR BLD AUTO: 1.5 % (ref 0.3–6.2)
ERYTHROCYTE [DISTWIDTH] IN BLOOD BY AUTOMATED COUNT: 14.3 % (ref 12.3–15.4)
FLUAV SUBTYP SPEC NAA+PROBE: NOT DETECTED
FLUBV RNA ISLT QL NAA+PROBE: NOT DETECTED
GEN 5 2HR TROPONIN T REFLEX: 8 NG/L
GLOBULIN UR ELPH-MCNC: 2.9 GM/DL
GLUCOSE SERPL-MCNC: 99 MG/DL (ref 65–99)
HADV DNA SPEC NAA+PROBE: NOT DETECTED
HCOV 229E RNA SPEC QL NAA+PROBE: NOT DETECTED
HCOV HKU1 RNA SPEC QL NAA+PROBE: NOT DETECTED
HCOV NL63 RNA SPEC QL NAA+PROBE: NOT DETECTED
HCOV OC43 RNA SPEC QL NAA+PROBE: NOT DETECTED
HCT VFR BLD AUTO: 44.6 % (ref 34–46.6)
HGB BLD-MCNC: 14.8 G/DL (ref 12–15.9)
HMPV RNA NPH QL NAA+NON-PROBE: NOT DETECTED
HPIV1 RNA ISLT QL NAA+PROBE: NOT DETECTED
HPIV2 RNA SPEC QL NAA+PROBE: NOT DETECTED
HPIV3 RNA NPH QL NAA+PROBE: NOT DETECTED
HPIV4 P GENE NPH QL NAA+PROBE: NOT DETECTED
LYMPHOCYTES # BLD AUTO: 1.5 10*3/MM3 (ref 0.7–3.1)
LYMPHOCYTES NFR BLD AUTO: 23 % (ref 19.6–45.3)
M PNEUMO IGG SER IA-ACNC: NOT DETECTED
MAXIMAL PREDICTED HEART RATE: 146 BPM
MCH RBC QN AUTO: 32.4 PG (ref 26.6–33)
MCHC RBC AUTO-ENTMCNC: 33.1 G/DL (ref 31.5–35.7)
MCV RBC AUTO: 97.9 FL (ref 79–97)
MONOCYTES # BLD AUTO: 0.4 10*3/MM3 (ref 0.1–0.9)
MONOCYTES NFR BLD AUTO: 6.7 % (ref 5–12)
NEUTROPHILS NFR BLD AUTO: 4.3 10*3/MM3 (ref 1.7–7)
NEUTROPHILS NFR BLD AUTO: 68 % (ref 42.7–76)
NRBC BLD AUTO-RTO: 0.1 /100 WBC (ref 0–0.2)
NT-PROBNP SERPL-MCNC: 296.3 PG/ML (ref 0–900)
PLATELET # BLD AUTO: 267 10*3/MM3 (ref 140–450)
PMV BLD AUTO: 8.2 FL (ref 6–12)
POTASSIUM SERPL-SCNC: 4 MMOL/L (ref 3.5–5.2)
PROT SERPL-MCNC: 7.1 G/DL (ref 6–8.5)
RBC # BLD AUTO: 4.56 10*6/MM3 (ref 3.77–5.28)
RHINOVIRUS RNA SPEC NAA+PROBE: NOT DETECTED
RSV RNA NPH QL NAA+NON-PROBE: NOT DETECTED
SARS-COV-2 RNA NPH QL NAA+NON-PROBE: NOT DETECTED
SODIUM SERPL-SCNC: 140 MMOL/L (ref 136–145)
STRESS TARGET HR: 124 BPM
TROPONIN T DELTA: -1 NG/L
TROPONIN T SERPL HS-MCNC: 9 NG/L
WBC NRBC COR # BLD: 6.3 10*3/MM3 (ref 3.4–10.8)

## 2023-05-22 PROCEDURE — G0378 HOSPITAL OBSERVATION PER HR: HCPCS

## 2023-05-22 PROCEDURE — 93005 ELECTROCARDIOGRAM TRACING: CPT | Performed by: PHYSICIAN ASSISTANT

## 2023-05-22 PROCEDURE — 99285 EMERGENCY DEPT VISIT HI MDM: CPT

## 2023-05-22 PROCEDURE — 85379 FIBRIN DEGRADATION QUANT: CPT | Performed by: PHYSICIAN ASSISTANT

## 2023-05-22 PROCEDURE — 25510000001 IOPAMIDOL PER 1 ML: Performed by: EMERGENCY MEDICINE

## 2023-05-22 PROCEDURE — 25010000002 ENOXAPARIN PER 10 MG: Performed by: NURSE PRACTITIONER

## 2023-05-22 PROCEDURE — 83880 ASSAY OF NATRIURETIC PEPTIDE: CPT | Performed by: PHYSICIAN ASSISTANT

## 2023-05-22 PROCEDURE — 85025 COMPLETE CBC W/AUTO DIFF WBC: CPT | Performed by: PHYSICIAN ASSISTANT

## 2023-05-22 PROCEDURE — 71046 X-RAY EXAM CHEST 2 VIEWS: CPT

## 2023-05-22 PROCEDURE — 36415 COLL VENOUS BLD VENIPUNCTURE: CPT | Performed by: NURSE PRACTITIONER

## 2023-05-22 PROCEDURE — 96372 THER/PROPH/DIAG INJ SC/IM: CPT

## 2023-05-22 PROCEDURE — 73562 X-RAY EXAM OF KNEE 3: CPT

## 2023-05-22 PROCEDURE — 87641 MR-STAPH DNA AMP PROBE: CPT | Performed by: NURSE PRACTITIONER

## 2023-05-22 PROCEDURE — 80053 COMPREHEN METABOLIC PANEL: CPT | Performed by: PHYSICIAN ASSISTANT

## 2023-05-22 PROCEDURE — 84484 ASSAY OF TROPONIN QUANT: CPT | Performed by: PHYSICIAN ASSISTANT

## 2023-05-22 PROCEDURE — 25010000002 METHYLPREDNISOLONE PER 125 MG

## 2023-05-22 PROCEDURE — 0202U NFCT DS 22 TRGT SARS-COV-2: CPT | Performed by: PHYSICIAN ASSISTANT

## 2023-05-22 PROCEDURE — 93971 EXTREMITY STUDY: CPT

## 2023-05-22 PROCEDURE — 87040 BLOOD CULTURE FOR BACTERIA: CPT

## 2023-05-22 PROCEDURE — 94640 AIRWAY INHALATION TREATMENT: CPT

## 2023-05-22 PROCEDURE — 86317 IMMUNOASSAY INFECTIOUS AGENT: CPT | Performed by: NURSE PRACTITIONER

## 2023-05-22 PROCEDURE — 71275 CT ANGIOGRAPHY CHEST: CPT

## 2023-05-22 RX ORDER — FUROSEMIDE 40 MG/1
80 TABLET ORAL DAILY
Status: DISCONTINUED | OUTPATIENT
Start: 2023-05-23 | End: 2023-05-23 | Stop reason: HOSPADM

## 2023-05-22 RX ORDER — IPRATROPIUM BROMIDE AND ALBUTEROL SULFATE 2.5; .5 MG/3ML; MG/3ML
3 SOLUTION RESPIRATORY (INHALATION)
Status: DISCONTINUED | OUTPATIENT
Start: 2023-05-22 | End: 2023-05-23 | Stop reason: HOSPADM

## 2023-05-22 RX ORDER — DULOXETIN HYDROCHLORIDE 30 MG/1
60 CAPSULE, DELAYED RELEASE ORAL DAILY
Status: DISCONTINUED | OUTPATIENT
Start: 2023-05-23 | End: 2023-05-23 | Stop reason: HOSPADM

## 2023-05-22 RX ORDER — ENOXAPARIN SODIUM 100 MG/ML
40 INJECTION SUBCUTANEOUS
Status: DISCONTINUED | OUTPATIENT
Start: 2023-05-22 | End: 2023-05-23 | Stop reason: HOSPADM

## 2023-05-22 RX ORDER — ALPRAZOLAM 0.5 MG/1
0.5 TABLET ORAL NIGHTLY PRN
Status: DISCONTINUED | OUTPATIENT
Start: 2023-05-22 | End: 2023-05-23 | Stop reason: HOSPADM

## 2023-05-22 RX ORDER — SODIUM CHLORIDE 0.9 % (FLUSH) 0.9 %
10 SYRINGE (ML) INJECTION AS NEEDED
Status: DISCONTINUED | OUTPATIENT
Start: 2023-05-22 | End: 2023-05-23 | Stop reason: HOSPADM

## 2023-05-22 RX ORDER — SODIUM CHLORIDE 0.9 % (FLUSH) 0.9 %
10 SYRINGE (ML) INJECTION EVERY 12 HOURS SCHEDULED
Status: DISCONTINUED | OUTPATIENT
Start: 2023-05-22 | End: 2023-05-23 | Stop reason: HOSPADM

## 2023-05-22 RX ORDER — ONDANSETRON 2 MG/ML
4 INJECTION INTRAMUSCULAR; INTRAVENOUS EVERY 6 HOURS PRN
Status: DISCONTINUED | OUTPATIENT
Start: 2023-05-22 | End: 2023-05-23 | Stop reason: HOSPADM

## 2023-05-22 RX ORDER — ACETAMINOPHEN 160 MG/5ML
650 SOLUTION ORAL EVERY 4 HOURS PRN
Status: DISCONTINUED | OUTPATIENT
Start: 2023-05-22 | End: 2023-05-23 | Stop reason: HOSPADM

## 2023-05-22 RX ORDER — LEVOTHYROXINE SODIUM 112 UG/1
112 TABLET ORAL
Status: DISCONTINUED | OUTPATIENT
Start: 2023-05-23 | End: 2023-05-23 | Stop reason: HOSPADM

## 2023-05-22 RX ORDER — SODIUM CHLORIDE 9 MG/ML
40 INJECTION, SOLUTION INTRAVENOUS AS NEEDED
Status: DISCONTINUED | OUTPATIENT
Start: 2023-05-22 | End: 2023-05-23 | Stop reason: HOSPADM

## 2023-05-22 RX ORDER — GUAIFENESIN 600 MG/1
600 TABLET, EXTENDED RELEASE ORAL EVERY 12 HOURS SCHEDULED
Status: DISCONTINUED | OUTPATIENT
Start: 2023-05-22 | End: 2023-05-23 | Stop reason: HOSPADM

## 2023-05-22 RX ORDER — ONDANSETRON 4 MG/1
4 TABLET, FILM COATED ORAL EVERY 6 HOURS PRN
Status: DISCONTINUED | OUTPATIENT
Start: 2023-05-22 | End: 2023-05-23 | Stop reason: HOSPADM

## 2023-05-22 RX ORDER — ACETAMINOPHEN 325 MG/1
650 TABLET ORAL EVERY 4 HOURS PRN
Status: DISCONTINUED | OUTPATIENT
Start: 2023-05-22 | End: 2023-05-23 | Stop reason: HOSPADM

## 2023-05-22 RX ORDER — METHYLPREDNISOLONE SODIUM SUCCINATE 125 MG/2ML
125 INJECTION, POWDER, LYOPHILIZED, FOR SOLUTION INTRAMUSCULAR; INTRAVENOUS ONCE
Status: COMPLETED | OUTPATIENT
Start: 2023-05-22 | End: 2023-05-22

## 2023-05-22 RX ORDER — IPRATROPIUM BROMIDE AND ALBUTEROL SULFATE 2.5; .5 MG/3ML; MG/3ML
3 SOLUTION RESPIRATORY (INHALATION) ONCE
Status: COMPLETED | OUTPATIENT
Start: 2023-05-22 | End: 2023-05-22

## 2023-05-22 RX ORDER — EPINEPHRINE 0.3 MG/.3ML
0.3 INJECTION SUBCUTANEOUS AS NEEDED
COMMUNITY

## 2023-05-22 RX ORDER — ACETAMINOPHEN 650 MG/1
650 SUPPOSITORY RECTAL EVERY 4 HOURS PRN
Status: DISCONTINUED | OUTPATIENT
Start: 2023-05-22 | End: 2023-05-23 | Stop reason: HOSPADM

## 2023-05-22 RX ORDER — ROPINIROLE 1 MG/1
2 TABLET, FILM COATED ORAL NIGHTLY
Status: DISCONTINUED | OUTPATIENT
Start: 2023-05-22 | End: 2023-05-23 | Stop reason: HOSPADM

## 2023-05-22 RX ADMIN — METHYLPREDNISOLONE SODIUM SUCCINATE 125 MG: 125 INJECTION, POWDER, FOR SOLUTION INTRAMUSCULAR; INTRAVENOUS at 22:33

## 2023-05-22 RX ADMIN — SODIUM CHLORIDE 500 ML: 0.9 INJECTION, SOLUTION INTRAVENOUS at 18:31

## 2023-05-22 RX ADMIN — Medication 10 ML: at 22:35

## 2023-05-22 RX ADMIN — ENOXAPARIN SODIUM 40 MG: 100 INJECTION SUBCUTANEOUS at 22:31

## 2023-05-22 RX ADMIN — GUAIFENESIN 600 MG: 600 TABLET, EXTENDED RELEASE ORAL at 22:32

## 2023-05-22 RX ADMIN — IPRATROPIUM BROMIDE AND ALBUTEROL SULFATE 3 ML: .5; 3 SOLUTION RESPIRATORY (INHALATION) at 16:43

## 2023-05-22 RX ADMIN — IOPAMIDOL 100 ML: 755 INJECTION, SOLUTION INTRAVENOUS at 15:58

## 2023-05-22 RX ADMIN — ROPINIROLE HYDROCHLORIDE 2 MG: 1 TABLET, FILM COATED ORAL at 22:32

## 2023-05-22 NOTE — ED PROVIDER NOTES
Subjective    Provider in Triage Note  Patient is a 73-year-old female presents to the ED with complaints of increased shortness of breath, chest pain, cough, left lower extremity swelling all over the past week.  Patient does report recent travel and history of DVT in the past.  She is not currently on any blood thinners.  Was initially seen at [facility] and [place] was advised to come to the ED for further management.  Upon arrival to the ED patient was found to be tachycardic.      History of Present Illness  Chief Complaint: Left knee pain and swelling, shortness of breath, dry cough      HPI: Additional information, patient states that while she was on a cruise of the home as she fell striking her left knee and she has had intermittent swelling that gets worse throughout the day with ambulation.  Improves at night and with Lasix.    PCP:    History provided by:  Patient      Review of Systems   Respiratory: Positive for cough and shortness of breath.    Cardiovascular: Negative for chest pain.   Gastrointestinal: Negative for abdominal pain, nausea and vomiting.       Past Medical History:   Diagnosis Date   • Ankylosing spondylitis    • Arthritis    • Asthma    • Connective tissue disease    • GERD (gastroesophageal reflux disease)    • Hypothyroidism 2/13/2020   • Pulmonary fibrosis    • Raynaud's disease        Allergies   Allergen Reactions   • Bee Venom Anaphylaxis   • [place] Flavor Swelling   • Adhesive Tape Other (See Comments)     Sensitive   • Flexeril [Cyclobenzaprine] Other (See Comments)     Hypotension   • Metoclopramide Other (See Comments)     Tardive diskinesia  Tardive diskinesia  tardive dyskinesia     • Morphine Hallucinations   • Penicillins Other (See Comments)     Childhood   • Sulfa Antibiotics Other (See Comments)     Childhood       Past Surgical History:   Procedure Laterality Date   • APPENDECTOMY     • BARIATRIC SURGERY  2010   • BUNIONECTOMY     • CHOLECYSTECTOMY     • NATIVIDAD  TOE REPAIR Left 6/5/2020    Procedure: HAMMER TOE REPAIR third and fourth toe;  Surgeon: TRELL Pérez DPM;  Location: [facility] MAIN OR;  Service: Podiatry;  Laterality: Left;   • KNEE SURGERY Right 2009   • SHOULDER SURGERY Bilateral 2011 and 2012       Family History   Adopted: Yes   Family history unknown: Yes       Social History     Socioeconomic History   • Marital status:    Tobacco Use   • Smoking status: Never     Passive exposure: Past   • Smokeless tobacco: Never   Vaping Use   • Vaping Use: Never used   Substance and Sexual Activity   • Alcohol use: Yes     Comment: OCCASIONAL   • Drug use: Never   • Sexual activity: Defer           Objective   Physical Exam  Vitals reviewed.   Constitutional:       Appearance: She is obese. She is not toxic-appearing.   HENT:      Head: Normocephalic.   Eyes:      Extraocular Movements: Extraocular movements intact.      Pupils: Pupils are equal, round, and reactive to light.   Cardiovascular:      Rate and Rhythm: Regular rhythm. Tachycardia present.      Pulses: Normal pulses.      Heart sounds: Normal heart sounds. No murmur heard.  Pulmonary:      Effort: Pulmonary effort is normal.      Breath sounds: Wheezing (expiratory) present.   Abdominal:      General: Bowel sounds are normal.      Palpations: Abdomen is soft.      Tenderness: There is no abdominal tenderness.   Musculoskeletal:      Cervical back: Neck supple. No rigidity.   Skin:     General: Skin is warm.   Neurological:      General: No focal deficit present.      Mental Status: She is alert and oriented to person, place, and time. Mental status is at baseline.         Procedures  EKG obtained sinus rhythm with a rate of 82 PVCs with an incomplete bundle branch block compared to previous that was obtained 2/7/2023, no acute changes, corroborated with Dr. Fish who agrees with interpretation.                 ED Course  ED Course as of 05/22/23 0115   Mon May 22, 2023   1349 [facility] vascular  "Technician: lt leg has chronic everywhere except gsv and ssv.  Compare pictures on right also showed chronic cfv and sfj.  Hx of dvt and filter per patient [AA]   1459 Patient evaluated in ED exam room.  Discussed CMP results as there is no acute kidney injury, no electrolyte imbalance, CBC notes no leukocytosis or anemia.  proBNP within normal limits as well as initial troponin.  D-dimer slightly elevated at 0.96 with patient's recent travel and history of DVT CT PE protocol is currently pending.  I have discussed with the patient recent travel with return to [facility] as well as other viral and seasonal allergy responses.  Patient in no acute distress with stable vital signs []   1530 . []      ED Course User Index  [AA] Matthew Amador PA  [] Wendy Mcgregor, BEHZAD           /78 (BP Location: Left arm, Patient Position: Lying)   Pulse 73   Temp 97.9 °F (36.6 °C) (Oral)   Resp 18   Ht 167.6 cm (66\")   Wt 91.4 kg (201 lb 9.6 oz)   SpO2 100%   BMI 32.54 kg/m²   Labs Reviewed   COMPREHENSIVE METABOLIC PANEL - Abnormal; Notable for the following components:       Result Value    BUN 29 (*)     Alkaline Phosphatase 123 (*)     BUN/Creatinine Ratio 33.0 (*)     All other components within normal limits    Narrative:     GFR Normal >60  Chronic Kidney Disease <60  Kidney Failure <15    The GFR formula is only valid for adults with stable renal function between ages 18 and 70.   D-DIMER, QUANTITATIVE - Abnormal; Notable for the following components:    D-Dimer, Quantitative 0.96 (*)     All other components within normal limits    Narrative:     According to the assay 's published package insert, a normal (<0.50 mg/L (FEU)) D-dimer result in conjunction with a non-high clinical probability assessment, excludes deep vein thrombosis (DVT) and pulmonary embolism (PE) with high sensitivity.    D-dimer values increase with age and this can make VTE exclusion of an older population " "difficult. To address this, the American College of [employer], based on best available evidence and recent guidelines, recommends that clinicians use age-adjusted D-dimer thresholds in patients greater than 50 years of age with: a) a low probability of PE who do not meet all Pulmonary Embolism Rule Out Criteria, or b) in those with intermediate probability of PE.   The formula for an age-adjusted D-dimer cut-off is \"age/100\".  For example, a 60 year old patient would have an age-adjusted cut-off of 0.60 mg/L (FEU) and an 80 year old 0.80 mg/L (FEU).   CBC WITH AUTO DIFFERENTIAL - Abnormal; Notable for the following components:    MCV 97.9 (*)     All other components within normal limits   RESPIRATORY PANEL PCR W/ COVID-19 (SARS-COV-2) BALJINDER/NATALI/YUDI/PAD/COR/MAD/MIGUEL IN-HOUSE, NP SWAB IN UTM/VTP, 3-4 HR TAT - Normal    Narrative:     In the setting of a positive respiratory panel with a viral infection PLUS a negative procalcitonin without other underlying concern for bacterial infection, consider observing off antibiotics or discontinuation of antibiotics and continue supportive care. If the respiratory panel is positive for atypical bacterial infection (Bordetella pertussis, Chlamydophila pneumoniae, or Mycoplasma pneumoniae), consider antibiotic de-escalation to target atypical bacterial infection.   BNP (IN-HOUSE) - Normal    Narrative:     Among patients with dyspnea, NT-proBNP is highly sensitive for the detection of acute congestive heart failure. In addition NT-proBNP of <300 pg/ml effectively rules out acute congestive heart failure with 99% negative predictive value.    Results may be falsely decreased if patient taking Biotin.     TROPONIN - Normal    Narrative:     High Sensitive Troponin T Reference Range:  <10.0 ng/L- Negative Female for AMI  <15.0 ng/L- Negative Male for AMI  >=10 - Abnormal Female indicating possible myocardial injury.  >=15 - Abnormal Male indicating possible myocardial injury. "   Clinicians would have to utilize clinical acumen, EKG, Troponin, and serial changes to determine if it is an Acute Myocardial Infarction or myocardial injury due to an underlying chronic condition.        HIGH SENSITIVITIY TROPONIN T 2HR - Normal    Narrative:     High Sensitive Troponin T Reference Range:  <10.0 ng/L- Negative Female for AMI  <15.0 ng/L- Negative Male for AMI  >=10 - Abnormal Female indicating possible myocardial injury.  >=15 - Abnormal Male indicating possible myocardial injury.   Clinicians would have to utilize clinical acumen, EKG, Troponin, and serial changes to determine if it is an Acute Myocardial Infarction or myocardial injury due to an underlying chronic condition.        BLOOD CULTURE   BLOOD CULTURE   LEGIONELLA ANTIGEN, URINE   RESPIRATORY CULTURE   MRSA SCREEN, PCR   STREP PNEUMONIAE ANTIBODY 7 SEROTYPES   CBC WITH AUTO DIFFERENTIAL   COMPREHENSIVE METABOLIC PANEL   TSH   T4, FREE   CBC AND DIFFERENTIAL    Narrative:     The following orders were created for panel order CBC & Differential.  Procedure                               Abnormality         Status                     ---------                               -----------         ------                     CBC Auto Differential[[id number]]        Abnormal            Final result                 Please view results for these tests on the individual orders.     Medications   sodium chloride 0.9 % flush 10 mL (has no administration in time range)   cefTRIAXone (ROCEPHIN) 1 g in sodium chloride 0.9 % 100 mL IVPB (has no administration in time range)   ALPRAZolam (XANAX) tablet 0.5 mg (has no administration in time range)   DULoxetine (CYMBALTA) DR capsule 60 mg (has no administration in time range)   furosemide (LASIX) tablet 80 mg (has no administration in time range)   levothyroxine (SYNTHROID, LEVOTHROID) tablet 112 mcg (has no administration in time range)   rOPINIRole (REQUIP) tablet 2 mg (2 mg Oral Given 5/22/23 2232)    sodium chloride 0.9 % flush 10 mL (10 mL Intravenous Given 5/22/23 2235)   sodium chloride 0.9 % flush 10 mL (has no administration in time range)   sodium chloride 0.9 % infusion 40 mL (has no administration in time range)   Pharmacy to Dose enoxaparin (LOVENOX) (has no administration in time range)   acetaminophen (TYLENOL) tablet 650 mg (has no administration in time range)     Or   acetaminophen (TYLENOL) 160 MG/5ML solution 650 mg (has no administration in time range)     Or   acetaminophen (TYLENOL) suppository 650 mg (has no administration in time range)   ondansetron (ZOFRAN) tablet 4 mg (has no administration in time range)     Or   ondansetron (ZOFRAN) injection 4 mg (has no administration in time range)   guaiFENesin (MUCINEX) 12 hr tablet 600 mg (600 mg Oral Given 5/22/23 2232)   ipratropium-albuterol (DUO-NEB) nebulizer solution 3 mL (3 mL Nebulization Not Given 5/22/23 2121)   Enoxaparin Sodium (LOVENOX) syringe 40 mg (40 mg Subcutaneous Given 5/22/23 2231)   sodium chloride 0.9 % bolus 500 mL (0 mL Intravenous Stopped 5/22/23 1835)   iopamidol (ISOVUE-370) 76 % injection 100 mL (100 mL Intravenous Given 5/22/23 1558)   ipratropium-albuterol (DUO-NEB) nebulizer solution 3 mL (3 mL Nebulization Given 5/22/23 1643)   methylPREDNISolone sodium succinate (SOLU-Medrol) injection 125 mg (125 mg Intravenous Given 5/22/23 2233)     XR Chest 2 View    Result Date: 5/22/2023  Impression: No acute chest finding. Electronically Signed: Otilia Sexton  5/22/2023 1:58 PM EDT  Workstation ID: [id number]    XR Knee 3 View Left    Result Date: 5/22/2023  1.No acute fractures or dislocations. 2.Moderate osteoarthritis. 3.Generalized osteopenia. Electronically Signed: Tre Reyes  5/22/2023 4:05 PM EDT  Workstation ID: [id number]    CT Angiogram Chest Pulmonary Embolism    Result Date: 5/22/2023  1.No acute pulmonary emboli. 2.Multifocal patchy infiltrates bilaterally, worse in right upper lobe and right middle lobe.  3.Large hiatal hernia. Markedly dilated esophagus with reflux up to the level of proximal esophagus increases risk for aspiration. 4.Gastric band is again seen. 5.Gastric wall thickening may represent gastritis or other etiologies. Follow-up recommended. Electronically Signed: Tre Reyes  5/22/2023 4:39 PM EDT  Workstation ID: [id number]                                    Medical Decision Making  As patient presented to the emergency room with shortness of breath, cough and intermittent swelling to her left knee, she did sustain a fall which may have exacerbated the swelling, x-rays were obtained were negative for fracture or dislocation per my interpretation did note arthritis, see ED course for official read.  Patient was placed on a cardiac monitor she had no episodes of hypoxia or tachypnea she is not tachycardic.  Expiratory wheezes noted she was given a dose of Solu-Medrol and a DuoNeb.  COVID-19 testing was negative.  D-dimer was slightly elevated at 0.96, CT PE protocol negative for pulmonary embolism but did note multifocal pneumonia.  Ultrasound was completed of the patient's lower extremity as well with a known history of DVT, chronic without acute.  Initial troponin at 9 with a delta of 8.  CMP negative for acute kidney injury no electrolyte imbalance, CBC noted no leukocytosis or anemia.  With review of the patient's chart she has tolerated Rocephin in the past, this was ordered as well as doxycycline.  Patient will be admitted for continued monitoring, antibiotic treatment, patient case discussed with Nehal Brenner nurse practitioner who was agreeable to patient admission.  Admitting physician Dr. Ramos.     Chart review: 2/13/2023 patient evaluation by primary care outpatient, cough, health maintenance      This document is intended for medical expert use only. Reading of this document by patients and/or patient's family without participating medical staff guidance may result in misinterpretation and  unintended morbidity. Any interpretation of such data is the responsibility of the patient and/or family member responsible for the patient in concert with their primary or specialist providers, not to be left for sources of online searches such as [employer], [employer] or similar queries. Relying on these approaches to knowledge may result in misinterpretation, misguided goals of care and even death should patients or family members try recommendations outside of the realm of professional medical care in a supervised inpatient environment.     Note: Please forgive punctuation, typographic/voice recognition errors, as portions of this document were created with Dragon Dictation, a voice recognition software.    Appropriate PPE worn during exam.    Multifocal pneumonia: acute illness or injury  Shortness of breath: acute illness or injury  Amount and/or Complexity of Data Reviewed  External Data Reviewed: labs and notes.  Labs: ordered. Decision-making details documented in ED Course.  Radiology: ordered and independent interpretation performed. Decision-making details documented in ED Course.  ECG/medicine tests: ordered and independent interpretation performed. Decision-making details documented in ED Course.      Risk  Prescription drug management.  Decision regarding hospitalization.          Final diagnoses:   Multifocal pneumonia   Shortness of breath       ED Disposition  ED Disposition     ED Disposition   Decision to Admit    Condition   --    Comment   Level of Care: Telemetry [5]   Diagnosis: Multifocal pneumonia [[id number]]   Admitting Physician: LENNY JIM [[id number]]   Attending Physician: LENNY JIM [[id number]]               No follow-up provider specified.       Medication List      No changes were made to your prescriptions during this visit.          Wendy Mcgregor, APRN  05/22/23 3464

## 2023-05-22 NOTE — ED NOTES
Repeat troponin and fluids delayed due to waiting on IV team pt received breathing tx for wheezing

## 2023-05-22 NOTE — Clinical Note
Level of Care: Telemetry [5]   Admitting Physician: LENNY JIM [[id number]]   Attending Physician: LENNY JIM [[id number]]

## 2023-05-23 ENCOUNTER — READMISSION MANAGEMENT (OUTPATIENT)
Dept: CALL CENTER | Facility: HOSPITAL | Age: 73
End: 2023-05-23
Payer: MEDICARE

## 2023-05-23 VITALS
BODY MASS INDEX: 32.4 KG/M2 | RESPIRATION RATE: 19 BRPM | TEMPERATURE: 98.4 F | OXYGEN SATURATION: 96 % | DIASTOLIC BLOOD PRESSURE: 64 MMHG | HEART RATE: 93 BPM | WEIGHT: 201.6 LBS | SYSTOLIC BLOOD PRESSURE: 112 MMHG | HEIGHT: 66 IN

## 2023-05-23 LAB
ALBUMIN SERPL-MCNC: 3.9 G/DL (ref 3.5–5.2)
ALBUMIN/GLOB SERPL: 1.4 G/DL
ALP SERPL-CCNC: 109 U/L (ref 39–117)
ALT SERPL W P-5'-P-CCNC: 15 U/L (ref 1–33)
ANION GAP SERPL CALCULATED.3IONS-SCNC: 14 MMOL/L (ref 5–15)
AST SERPL-CCNC: 22 U/L (ref 1–32)
BASOPHILS # BLD AUTO: 0 10*3/MM3 (ref 0–0.2)
BASOPHILS NFR BLD AUTO: 0.3 % (ref 0–1.5)
BILIRUB SERPL-MCNC: 0.4 MG/DL (ref 0–1.2)
BUN SERPL-MCNC: 24 MG/DL (ref 8–23)
BUN/CREAT SERPL: 27.9 (ref 7–25)
CALCIUM SPEC-SCNC: 9.6 MG/DL (ref 8.6–10.5)
CHLORIDE SERPL-SCNC: 104 MMOL/L (ref 98–107)
CO2 SERPL-SCNC: 22 MMOL/L (ref 22–29)
CREAT SERPL-MCNC: 0.86 MG/DL (ref 0.57–1)
DEPRECATED RDW RBC AUTO: 50.8 FL (ref 37–54)
EGFRCR SERPLBLD CKD-EPI 2021: 71 ML/MIN/1.73
EOSINOPHIL # BLD AUTO: 0.1 10*3/MM3 (ref 0–0.4)
EOSINOPHIL NFR BLD AUTO: 1 % (ref 0.3–6.2)
ERYTHROCYTE [DISTWIDTH] IN BLOOD BY AUTOMATED COUNT: 14.1 % (ref 12.3–15.4)
GLOBULIN UR ELPH-MCNC: 2.8 GM/DL
GLUCOSE SERPL-MCNC: 166 MG/DL (ref 65–99)
HCT VFR BLD AUTO: 39.3 % (ref 34–46.6)
HGB BLD-MCNC: 13.2 G/DL (ref 12–15.9)
L PNEUMO1 AG UR QL IA: NEGATIVE
LYMPHOCYTES # BLD AUTO: 0.8 10*3/MM3 (ref 0.7–3.1)
LYMPHOCYTES NFR BLD AUTO: 10.7 % (ref 19.6–45.3)
MCH RBC QN AUTO: 33 PG (ref 26.6–33)
MCHC RBC AUTO-ENTMCNC: 33.7 G/DL (ref 31.5–35.7)
MCV RBC AUTO: 97.9 FL (ref 79–97)
MONOCYTES # BLD AUTO: 0.1 10*3/MM3 (ref 0.1–0.9)
MONOCYTES NFR BLD AUTO: 1.4 % (ref 5–12)
MRSA DNA SPEC QL NAA+PROBE: NORMAL
NEUTROPHILS NFR BLD AUTO: 6.4 10*3/MM3 (ref 1.7–7)
NEUTROPHILS NFR BLD AUTO: 86.6 % (ref 42.7–76)
NRBC BLD AUTO-RTO: 0 /100 WBC (ref 0–0.2)
PLATELET # BLD AUTO: 240 10*3/MM3 (ref 140–450)
PMV BLD AUTO: 8.8 FL (ref 6–12)
POTASSIUM SERPL-SCNC: 4.2 MMOL/L (ref 3.5–5.2)
PROT SERPL-MCNC: 6.7 G/DL (ref 6–8.5)
QT INTERVAL: 380 MS
RBC # BLD AUTO: 4.01 10*6/MM3 (ref 3.77–5.28)
SODIUM SERPL-SCNC: 140 MMOL/L (ref 136–145)
T4 FREE SERPL-MCNC: 1.48 NG/DL (ref 0.93–1.7)
TSH SERPL DL<=0.05 MIU/L-ACNC: 2.8 UIU/ML (ref 0.27–4.2)
WBC NRBC COR # BLD: 7.4 10*3/MM3 (ref 3.4–10.8)

## 2023-05-23 PROCEDURE — 84443 ASSAY THYROID STIM HORMONE: CPT | Performed by: NURSE PRACTITIONER

## 2023-05-23 PROCEDURE — 94799 UNLISTED PULMONARY SVC/PX: CPT

## 2023-05-23 PROCEDURE — 94761 N-INVAS EAR/PLS OXIMETRY MLT: CPT

## 2023-05-23 PROCEDURE — 84439 ASSAY OF FREE THYROXINE: CPT | Performed by: NURSE PRACTITIONER

## 2023-05-23 PROCEDURE — 85025 COMPLETE CBC W/AUTO DIFF WBC: CPT | Performed by: NURSE PRACTITIONER

## 2023-05-23 PROCEDURE — 87449 NOS EACH ORGANISM AG IA: CPT | Performed by: NURSE PRACTITIONER

## 2023-05-23 PROCEDURE — 94664 DEMO&/EVAL PT USE INHALER: CPT

## 2023-05-23 PROCEDURE — 80053 COMPREHEN METABOLIC PANEL: CPT | Performed by: NURSE PRACTITIONER

## 2023-05-23 PROCEDURE — 96365 THER/PROPH/DIAG IV INF INIT: CPT

## 2023-05-23 PROCEDURE — G0378 HOSPITAL OBSERVATION PER HR: HCPCS

## 2023-05-23 PROCEDURE — 87040 BLOOD CULTURE FOR BACTERIA: CPT

## 2023-05-23 PROCEDURE — 25010000002 CEFTRIAXONE PER 250 MG

## 2023-05-23 RX ORDER — CEFUROXIME AXETIL 500 MG/1
500 TABLET ORAL 2 TIMES DAILY
Qty: 12 TABLET | Refills: 0 | Status: SHIPPED | OUTPATIENT
Start: 2023-05-23 | End: 2023-05-29

## 2023-05-23 RX ADMIN — Medication 10 ML: at 07:51

## 2023-05-23 RX ADMIN — LEVOTHYROXINE SODIUM 112 MCG: 0.11 TABLET ORAL at 05:33

## 2023-05-23 RX ADMIN — Medication 10 ML: at 10:26

## 2023-05-23 RX ADMIN — FUROSEMIDE 80 MG: 40 TABLET ORAL at 07:43

## 2023-05-23 RX ADMIN — Medication 10 ML: at 07:49

## 2023-05-23 RX ADMIN — IPRATROPIUM BROMIDE AND ALBUTEROL SULFATE 3 ML: .5; 3 SOLUTION RESPIRATORY (INHALATION) at 06:37

## 2023-05-23 RX ADMIN — GUAIFENESIN 600 MG: 600 TABLET, EXTENDED RELEASE ORAL at 07:43

## 2023-05-23 RX ADMIN — CEFTRIAXONE 1 G: 1 INJECTION, POWDER, FOR SOLUTION INTRAMUSCULAR; INTRAVENOUS at 02:06

## 2023-05-23 RX ADMIN — ACETAMINOPHEN 650 MG: 325 TABLET, FILM COATED ORAL at 07:42

## 2023-05-23 RX ADMIN — DULOXETINE HYDROCHLORIDE 60 MG: 30 CAPSULE, DELAYED RELEASE ORAL at 07:42

## 2023-05-23 NOTE — DISCHARGE SUMMARY
[facility] Medicine Services  Discharge Summary    Date of Service: 23  Patient condition: Stable    Patient Name: Jo Prescott  : 1950  MRN: [id number]    Date of Admission: 2023  Discharge Diagnosis: community acquired bacterial pneumonia, multifocal pneumonia   Date of Discharge:  23    Primary Care Physician: Kenyatta Talavera APRN      Presenting Problem:   Shortness of breath [R06.02]  Multifocal pneumonia [J18.9]    Active and Resolved Hospital Problems:  Active Hospital Problems    Diagnosis POA   • **Multifocal pneumonia [J18.9] Yes   • Peripheral edema [R60.9] Yes   • Hypothyroidism [E03.9] Yes   • Clotting disorder [D68.9] Yes   • Connective tissue disease [M35.9] Yes      Resolved Hospital Problems   No resolved problems to display.         Hospital Course     Hospital Course:  Jo Prescott is a 73 y.o. female Patient is a 73-year-old female who presented with chief complaint of cough and shortness of breath with some left lower extremity pain.  She was admitted and treated for community-acquired bacterial pneumonia/multifocal pneumonia was treated with Rocephin.  Culture was negative for atypicals or virus.  Patient still has, but her symptoms have improved.  She had a CTA which was negative for PE.  Also had a duplex which was negative for acute DVT she does have chronic DVTs both sides and she does not take anticoagulation due to history of bleeding with warfarin.  She takes Lovenox when she travels.  Discussed with her following up with hematology and she will schedule appointment and consider NOAC.  Patient stable for discharge will complete total 7 days of antibiotics and follow-up with primary care physician as an outpatient.        DISCHARGE Follow Up Recommendations for labs and diagnostics:       Reasons For Change In Medications and Indications for New Medications:      Day of Discharge     Vital Signs:  Temp:  [97.9 °F (36.6 °C)-98.6 °F (37  °C)] 98.4 °F (36.9 °C)  Heart Rate:  [] 93  Resp:  [12-20] 19  BP: (112-130)/(64-84) 112/64  Flow (L/min):  [2] 2    Physical Exam:  Physical Exam   General: No acute distress  HEENT: Neck supple, normal oral mucosa, no masses, no lymphadenopathy  Lungs: Clear bilaterally, no wheezing, no crackles, no rhonchi. Equal excursions.   CV - Normal S1/S2, no murmur, regular rate and rhythm   Abdomen - Soft, nontender, nondistended, normal bowel sounds  Extremities - no edema, no erythema  Neuro - No focal weakness, normal sensation  Psych - Alert and oriented x3  Skin - no wounds or lesions.         Pertinent  and/or Most Recent Results     LAB RESULTS:      Lab 05/23/23  0130 05/22/23  1316   WBC 7.40 6.30   HEMOGLOBIN 13.2 14.8   HEMATOCRIT 39.3 44.6   PLATELETS 240 267   NEUTROS ABS 6.40 4.30   LYMPHS ABS 0.80 1.50   MONOS ABS 0.10 0.40   EOS ABS 0.10 0.10   MCV 97.9* 97.9*         Lab 05/23/23  0130 05/22/23  1316   SODIUM 140 140   POTASSIUM 4.2 4.0   CHLORIDE 104 100   CO2 22.0 26.0   ANION GAP 14.0 14.0   BUN 24* 29*   CREATININE 0.86 0.88   EGFR 71.0 69.1   GLUCOSE 166* 99   CALCIUM 9.6 9.8   TSH 2.800  --          Lab 05/23/23  0130 05/22/23  1316   TOTAL PROTEIN 6.7 7.1   ALBUMIN 3.9 4.2   GLOBULIN 2.8 2.9   ALT (SGPT) 15 19   AST (SGOT) 22 24   BILIRUBIN 0.4 0.5   ALK PHOS 109 123*         Lab 05/22/23  1743 05/22/23  1316   PROBNP  --  296.3   HSTROP T 8 9                 Brief Urine Lab Results     None        Microbiology Results (last 10 days)     Procedure Component Value - Date/Time    Legionella Antigen, Urine - Urine, Urine, Clean Catch [[id number]]  (Normal) Collected: 05/23/23 0514    Lab Status: Final result Specimen: Urine, Clean Catch Updated: 05/23/23 0543     LEGIONELLA ANTIGEN, URINE Negative    MRSA Screen, PCR (Inpatient) - Swab, Nares [[id number]]  (Normal) Collected: 05/22/23 2238    Lab Status: Final result Specimen: Swab from Nares Updated: 05/23/23 0258     MRSA PCR No MRSA Detected     Narrative:      The negative predictive value of this diagnostic test is high and should only be used to consider de-escalating anti-MRSA therapy. A positive result may indicate colonization with MRSA and must be correlated clinically.    Respiratory Panel PCR w/COVID-19(SARS-CoV-2) BALJINDER/NATALI/YUDI/PAD/COR/MAD/MIGUEL In-House, NP Swab in UTM/VTM, 3-4 HR TAT - Swab, Nasopharynx [[id number]]  (Normal) Collected: 05/22/23 1314    Lab Status: Final result Specimen: Swab from Nasopharynx Updated: 05/22/23 1411     ADENOVIRUS, PCR Not Detected     Coronavirus 229E Not Detected     Coronavirus HKU1 Not Detected     Coronavirus NL63 Not Detected     Coronavirus OC43 Not Detected     COVID19 Not Detected     Human Metapneumovirus Not Detected     Human Rhinovirus/Enterovirus Not Detected     Influenza A PCR Not Detected     Influenza B PCR Not Detected     Parainfluenza Virus 1 Not Detected     Parainfluenza Virus 2 Not Detected     Parainfluenza Virus 3 Not Detected     Parainfluenza Virus 4 Not Detected     RSV, PCR Not Detected     Bordetella pertussis pcr Not Detected     Bordetella parapertussis PCR Not Detected     Chlamydophila pneumoniae PCR Not Detected     Mycoplasma pneumo by PCR Not Detected    Narrative:      In the setting of a positive respiratory panel with a viral infection PLUS a negative procalcitonin without other underlying concern for bacterial infection, consider observing off antibiotics or discontinuation of antibiotics and continue supportive care. If the respiratory panel is positive for atypical bacterial infection (Bordetella pertussis, Chlamydophila pneumoniae, or Mycoplasma pneumoniae), consider antibiotic de-escalation to target atypical bacterial infection.          XR Chest 2 View    Result Date: 5/22/2023  Impression: Impression: No acute chest finding. Electronically Signed: Otilia Sexton  5/22/2023 1:58 PM EDT  Workstation ID: [id number]    XR Knee 3 View Left    Result Date: 5/22/2023  Impression:  1.No acute fractures or dislocations. 2.Moderate osteoarthritis. 3.Generalized osteopenia. Electronically Signed: Tre Reyes  5/22/2023 4:05 PM EDT  Workstation ID: [id number]    CT Angiogram Chest Pulmonary Embolism    Result Date: 5/22/2023  Impression: 1.No acute pulmonary emboli. 2.Multifocal patchy infiltrates bilaterally, worse in right upper lobe and right middle lobe. 3.Large hiatal hernia. Markedly dilated esophagus with reflux up to the level of proximal esophagus increases risk for aspiration. 4.Gastric band is again seen. 5.Gastric wall thickening may represent gastritis or other etiologies. Follow-up recommended. Electronically Signed: Tre Eric  5/22/2023 4:39 PM EDT  Workstation ID: [id number]      Results for orders placed during the hospital encounter of 05/22/23    Duplex venous lower extremity left    Interpretation Summary  •  Chronic right lower extremity deep vein thrombosis noted in the common femoral.  •  Chronic left lower extremity deep vein thrombosis noted in the common femoral, proximal femoral, mid femoral, distal femoral, popliteal, posterial tibial and gastrocnemius.  •  Chronic left lower extremity superficial thrombophlebitis noted in the saphenofemoral junction.  •  There was deep venous valvular incompetence noted in the right common femoral.  •  All other left sided veins appeared normal.      Results for orders placed during the hospital encounter of 05/22/23    Duplex venous lower extremity left    Interpretation Summary  •  Chronic right lower extremity deep vein thrombosis noted in the common femoral.  •  Chronic left lower extremity deep vein thrombosis noted in the common femoral, proximal femoral, mid femoral, distal femoral, popliteal, posterial tibial and gastrocnemius.  •  Chronic left lower extremity superficial thrombophlebitis noted in the saphenofemoral junction.  •  There was deep venous valvular incompetence noted in the right common femoral.  •  All other left  sided veins appeared normal.          Labs Pending at Discharge:  Pending Labs     Order Current Status    Blood Culture - Blood, Arm, Right In process    Blood Culture - Blood, Hand, Left In process    Strep Pneumoniae Antibody 7 Serotypes In process          Procedures Performed           Consults:   Consults     Date and Time Order Name Status Description    5/22/2023  5:52 PM Hospitalist (on-call MD unless specified)              Discharge Details        Discharge Medications      New Medications      Instructions Start Date   cefuroxime 500 MG tablet  Commonly known as: CEFTIN   500 mg, Oral, 2 Times Daily, Take first dose tonight 5/23/2023         Continue These Medications      Instructions Start Date   ALPRAZolam 1 MG tablet  Commonly known as: XANAX   1 mg, Oral, As Needed      celecoxib 200 MG capsule  Commonly known as: CeleBREX   200 mg, Oral, Daily, Hold 7 days prior to surgery      cyanocobalamin 1000 MCG/ML injection   1,000 mcg, Intramuscular, Every 14 Days      DULoxetine 60 MG capsule  Commonly known as: CYMBALTA   60 mg, Oral, Daily      EPINEPHrine 0.3 MG/0.3ML solution auto-injector injection  Commonly known as: EPIPEN   0.3 mg, Subcutaneous, As Needed      furosemide 80 MG tablet  Commonly known as: LASIX   80 mg, Oral, Daily, Hold DOS      levothyroxine 112 MCG tablet  Commonly known as: SYNTHROID, LEVOTHROID   112 mcg, Oral, Daily      NIFEdipine XL 30 MG 24 hr tablet  Commonly known as: PROCARDIA XL   30 mg, Oral, Daily, 6 months out of the year Oct through April      ProAir RespiClick 108 (90 Base) MCG/ACT inhaler  Generic drug: albuterol   2 puffs, Inhalation, Every 4 Hours PRN      rOPINIRole 1 MG tablet  Commonly known as: REQUIP   2 mg, Oral, Nightly             Allergies   Allergen Reactions   • Bee Venom Anaphylaxis   • [place] Flavor Swelling   • Adhesive Tape Other (See Comments)     Sensitive   • Flexeril [Cyclobenzaprine] Other (See Comments)     Hypotension   • Metoclopramide Other  (See Comments)     Tardive diskinesia  Tardive diskinesia  tardive dyskinesia     • Morphine Hallucinations   • Penicillins Other (See Comments)     Childhood   • Sulfa Antibiotics Other (See Comments)     Childhood         Discharge Disposition: home with OP follow up  Home or Self Care    Diet:  Hospital:  Diet Order   Procedures   • Diet: Regular/House Diet; Texture: Regular Texture (IDDSI 7); Fluid Consistency: Thin (IDDSI 0)         Discharge Activity:         CODE STATUS:  Code Status and Medical Interventions:   Ordered at: 05/22/23 2004     Code Status (Patient has no pulse and is not breathing):    CPR (Attempt to Resuscitate)     Medical Interventions (Patient has pulse or is breathing):    Full Support         Future Appointments   Date Time Provider Department Center   6/27/2023  9:45 AM TRELL Pérez DPM [facility]           Time spent on Discharge including face to face service:  31 minutes    This patient has been  and discussed with . 05/23/23      Signature: Electronically signed by Pina Jay MD, 05/23/23, 12:27 EDT.  [facility] Hospitalist Team

## 2023-05-23 NOTE — CASE MANAGEMENT/SOCIAL WORK
Case Management Discharge Note      Final Note: Routine home.         Selected Continued Care - Discharged on 5/23/2023 Admission date: 5/22/2023 - Discharge disposition: Home or Self Care     Transportation Services  Private: Car    Final Discharge Disposition Code: 01 - home or self-care

## 2023-05-23 NOTE — CASE MANAGEMENT/SOCIAL WORK
Continued Stay Note  [facility]     Patient Name: Jo Prescott  MRN: [id number]  Today's Date: 5/23/2023    Admit Date: 5/22/2023        Discharge Plan       Row Name 05/23/23 1444       Plan    Plan Comments Discharge orders noted. CM spoke to patient briefly and she denied any needs. Dressed and ready to go.             Megan Naegele, RN     Office Phone: [phone]  Office Cell: [phone]

## 2023-05-23 NOTE — PLAN OF CARE
Goal Outcome Evaluation:                  Patient admitted to unit for pneumonia. Patient alert and oriented x4. Ambulates independently.
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
## 2023-05-24 NOTE — OUTREACH NOTE
Prep Survey    Flowsheet Row Responses   [facility] patient discharged from? [facility]   Is LACE score < 7 ? No   Eligibility Readm Mgmt   Discharge diagnosis community acquired bacterial pneumonia, multifocal pneumonia   Does the patient have one of the following disease processes/diagnoses(primary or secondary)? Pneumonia   Does the patient have Home health ordered? No   Is there a DME ordered? No   Prep survey completed? Yes          Elissa DONALD - Registered Nurse

## 2023-05-27 LAB — BACTERIA SPEC AEROBE CULT: NORMAL

## 2023-05-28 LAB — BACTERIA SPEC AEROBE CULT: NORMAL

## 2023-06-01 ENCOUNTER — TELEPHONE (OUTPATIENT)
Dept: ORTHOPEDIC SURGERY | Facility: CLINIC | Age: 73
End: 2023-06-01

## 2023-06-01 NOTE — TELEPHONE ENCOUNTER
Caller: CELI RIOJAS    Relationship to patient: SELF    Best call back number: [phone]    Chief complaint: FOLLOW UP LEFT FOOT PLANTAR WART    Type of visit: FOLLOW UP LEFT FOOT PLANTAR WART    Requested date: ASAP     If rescheduling, when is the original appointment: 06/27/2023    Additional notes: PATIENT IS EXPERIENCING PAIN

## 2023-06-05 ENCOUNTER — READMISSION MANAGEMENT (OUTPATIENT)
Dept: CALL CENTER | Facility: HOSPITAL | Age: 73
End: 2023-06-05
Payer: MEDICARE

## 2023-06-05 NOTE — OUTREACH NOTE
COPD/PN Week 1 Survey      Flowsheet Row Responses   [facility] patient discharged from? [facility]   Does the patient have one of the following disease processes/diagnoses(primary or secondary)? Pneumonia   Week 1 attempt successful? Yes   Call start time 1141   Call end time 1143   Discharge diagnosis community acquired bacterial pneumonia, multifocal pneumonia   Person spoke with today (if not patient) and relationship Son- Ismael Preciado reviewed with patient/caregiver? Yes   Does the patient have all medications ordered at discharge? Yes   Is the patient taking all medications as directed (includes completed medication regime)? Yes   Does the patient have a primary care provider?  Yes   Comments regarding PCP Son states he believes his mother has a fu appt scheduled but he is not 100% sure however understands the importance of fu   Has home health visited the patient within 72 hours of discharge? N/A   Pulse Ox monitoring None   Psychosocial issues? No   Did the patient receive a copy of their discharge instructions? Yes   Nursing interventions Reviewed instructions with patient   What is the patient's perception of their health status since discharge? Improving   Nursing Interventions Nurse provided patient education   Is the patient/caregiver able to teach back signs and symptoms of worsening condition: Fever/chills, Shortness of breath, Chest pain   Is the patient/caregiver able to teach back importance of completing antibiotic course of treatment? Yes   Week 1 call completed? Yes   Wrap up additional comments Son states his mother is doing much better, they had gone out last night with no concerns. Son will make sure mother has fu appt scheduled. No concerns or questions noted.            Kelley CAI - Registered Nurse

## 2023-07-15 PROBLEM — I50.32 CHRONIC DIASTOLIC HEART FAILURE: Status: ACTIVE | Noted: 2018-07-11

## 2023-07-15 PROBLEM — R42 DIZZINESS: Status: ACTIVE | Noted: 2023-07-15

## 2023-10-28 ENCOUNTER — HOSPITAL ENCOUNTER (OUTPATIENT)
Facility: HOSPITAL | Age: 73
Discharge: HOME | End: 2023-10-28
Attending: EMERGENCY MEDICINE | Admitting: EMERGENCY MEDICINE
Payer: MEDICARE

## 2023-10-28 ENCOUNTER — APPOINTMENT (OUTPATIENT)
Dept: GENERAL RADIOLOGY | Facility: HOSPITAL | Age: 73
End: 2023-10-28
Payer: MEDICARE

## 2023-10-28 VITALS
DIASTOLIC BLOOD PRESSURE: 54 MMHG | HEIGHT: 66 IN | TEMPERATURE: 97.6 F | RESPIRATION RATE: 16 BRPM | SYSTOLIC BLOOD PRESSURE: 117 MMHG | BODY MASS INDEX: 31.34 KG/M2 | HEART RATE: 87 BPM | WEIGHT: 195 LBS

## 2023-10-28 DIAGNOSIS — S80.12XA HEMATOMA OF LEFT LOWER LEG: ICD-10-CM

## 2023-10-28 DIAGNOSIS — S80.12XA CONTUSION OF LEFT LOWER EXTREMITY, INITIAL ENCOUNTER: Primary | ICD-10-CM

## 2023-10-28 PROCEDURE — 73590 X-RAY EXAM OF LOWER LEG: CPT

## 2023-10-28 PROCEDURE — G0463 HOSPITAL OUTPT CLINIC VISIT: HCPCS | Performed by: EMERGENCY MEDICINE

## 2023-10-28 RX ORDER — CEPHALEXIN 500 MG/1
500 CAPSULE ORAL 4 TIMES DAILY
Qty: 28 CAPSULE | Refills: 0 | Status: SHIPPED | OUTPATIENT
Start: 2023-10-28 | End: 2023-11-04

## 2023-10-29 NOTE — FSED PROVIDER NOTE
Subjective   History of Present Illness  Patient is a 73-year-old woman who presents complaining of tenderness with swelling to the upper part of her left lower leg.  Symptoms began shortly after the patient tripped and fell forward landing directly on the left lower leg.  [place]  She had ice applied, which reduce some of the swelling.  Incident occurred 8 days ago.  She had no fevers or nausea or vomiting.  She does feel slight decrease sensation to the front part of the leg where the injury was.  No open wounds.  No foot pain or hip pain.      Review of Systems    Past Medical History:   Diagnosis Date    Ankylosing spondylitis     Arthritis     Asthma     Bunion 2011/2012    Connective tissue disease     Difficulty walking various    GERD (gastroesophageal reflux disease)     Hammer toe 2020    History of transfusion 1987    Hypothyroidism 02/13/2020    Pulmonary fibrosis     Raynaud's disease     Stress fracture various    Verruca FOOT       Allergies   Allergen Reactions    Bee Venom Anaphylaxis    Thien Flavor Swelling    Adhesive Tape Other (See Comments)     Sensitive    Flexeril [Cyclobenzaprine] Other (See Comments)     Hypotension    Metoclopramide Other (See Comments)     Tardive diskinesia  Tardive diskinesia  tardive dyskinesia      Morphine Hallucinations    Penicillins Other (See Comments)     Childhood    Sulfa Antibiotics Other (See Comments)     Childhood    Tetanus Toxoid Unknown - High Severity    Warfarin Other (See Comments)       Past Surgical History:   Procedure Laterality Date    APPENDECTOMY      BARIATRIC SURGERY  2010    BUNIONECTOMY      CHOLECYSTECTOMY      CORRECTION HAMMER TOE  july 20    HAMMER TOE REPAIR Left 06/05/2020    Procedure: HAMMER TOE REPAIR third and fourth toe;  Surgeon: TRELL Pérez DPM;  Location: [facility] MAIN OR;  Service: Podiatry;  Laterality: Left;    KNEE SURGERY Right 2009    SHOULDER SURGERY Bilateral 2011 and 2012        Family History   Adopted: Yes   Problem Relation Age of Onset    No Known Problems Mother     No Known Problems Father        Social History     Socioeconomic History    Marital status:    Tobacco Use    Smoking status: Never     Passive exposure: Past    Smokeless tobacco: Never    Tobacco comments:     never   Vaping Use    Vaping Use: Never used   Substance and Sexual Activity    Alcohol use: Yes     Comment: seldom    Drug use: Never    Sexual activity: Not Currently     Partners: Male     Birth control/protection: None     Comment: adopted           Objective   Physical Exam  Vitals and nursing note reviewed.   Constitutional:       General: She is not in acute distress.     Appearance: Normal appearance. She is not ill-appearing or toxic-appearing.   HENT:      Head: Normocephalic and atraumatic.      Nose: Nose normal.      Mouth/Throat:      Mouth: Mucous membranes are moist.   Eyes:      Extraocular Movements: Extraocular movements intact.   Cardiovascular:      Rate and Rhythm: Normal rate.      Pulses: Normal pulses.   Pulmonary:      Effort: Pulmonary effort is normal.   Musculoskeletal:         General: Swelling and tenderness present. Normal range of motion.      Cervical back: Normal range of motion and neck supple.      Comments: Left lower leg examination reveals tenderness with a hematoma to the proximal portion of the anterior left lower leg.  There is tenderness with palpation.  Patient is neurovascular intact in the foot with 2+ pedal pulse present.  Cap refill in the toes less than 2 seconds.  The patient has no knee pain or ankle pain.  No calf swelling or tenderness   Skin:     General: Skin is warm and dry.      Capillary Refill: Capillary refill takes less than 2 seconds.      Findings: No erythema.   Neurological:      General: No focal deficit present.      Mental Status: She is alert.         Procedures           ED Course                                           Medical  Decision Making  Amount and/or Complexity of Data Reviewed  Radiology: ordered.    Patient is a 73-year-old woman who had a mechanical fall 8 days ago and landing directly on the proximal portion of her left lower leg.  She has since developed a hematoma which is tender.  There is no erythema noted and no warmth to the leg.  Patient is neurovascular intact in the left foot with 2+ pedal pulse present and capillary fill less than 2 seconds.  Patient underwent imaging as she had not been imaged after injury.  Imaging of the right tib-fib was obtained which independent reviewed.  Do not see any acute bony injuries.  Patient advised likely hematoma.  Patient also advised that hematomas can develop secondary infections and patient will be placed on Keflex in the event that she is developing early infection.  Patient advised to return if any concerns.    Final diagnoses:   Contusion of left lower extremity, initial encounter   Hematoma of left lower leg       ED Disposition  ED Disposition       ED Disposition   Discharge    Condition   Stable    Comment   --               Kenyatta Talavera, APRN  [address] [place] [address] [phone]    In 3 days           Medication List        New Prescriptions      cephalexin 500 MG capsule  Commonly known as: KEFLEX  Take 1 capsule by mouth 4 (Four) Times a Day for 7 days.               Where to Get Your Medications        These medications were sent to [facility]/pharmacy #[id number] - [place], [place] - [address] [place] [address] - [phone]  - [phone] [address] [place] [address]      Hours: 24-hours Phone: [phone]   cephalexin 500 MG capsule

## 2023-10-29 NOTE — DISCHARGE INSTRUCTIONS
Take Keflex as prescribed.  Apply warm compress to sore area.  Rest and elevate.  Recommend repeat imaging in 7 to 10 days if pain persist.  Seek immediate medical attention at any time if having any concerns.

## (undated) DEVICE — MICRO SAGITTAL BLADE (4.1 X 0.4 X 25.5MM)

## (undated) DEVICE — INTENDED FOR TISSUE SEPARATION, AND OTHER PROCEDURES THAT REQUIRE A SHARP SURGICAL BLADE TO PUNCTURE OR CUT.: Brand: BARD-PARKER ® CARBON RIB-BACK BLADES

## (undated) DEVICE — SOL IRRIG NACL 9PCT 1000ML BTL

## (undated) DEVICE — 1010 S-DRAPE TOWEL DRAPE 10/BX: Brand: STERI-DRAPE™

## (undated) DEVICE — GLV SURG BIOGEL M LTX PF 7 1/2

## (undated) DEVICE — BNDG ESMARK 4IN 12FT LF STRL BLU

## (undated) DEVICE — DRAPE,U/ SHT,SPLIT,PLAS,STERIL: Brand: MEDLINE

## (undated) DEVICE — OCCLUSIVE GAUZE STRIP OVERWRAP,3% BISMUTH TRIBROMOPHENATE IN PETROLATUM BLEND: Brand: XEROFORM

## (undated) DEVICE — CUFF TOURNI 1BLADDER 1PRT 18IN STRL

## (undated) DEVICE — PK EXTREM 50

## (undated) DEVICE — SUT ETHLN 4/0 FS2 18IN 662G

## (undated) DEVICE — GRD PIN WHT 0.45

## (undated) DEVICE — SOL IRRIG H2O 1000ML STRL

## (undated) DEVICE — GLV SURG SENSICARE PI LF PF 8 GRN STRL

## (undated) DEVICE — SUT VIC FS2 4/0 27IN J422H

## (undated) DEVICE — GOWN,REINFORCE,POLY,SIRUS,BREATH SLV,XLG: Brand: MEDLINE

## (undated) DEVICE — CUFF SCD HEMOFORCE SEQ CALF STD MD

## (undated) DEVICE — GAUZE,SPONGE,FLUFF,6"X6.75",STRL,5/TRAY: Brand: MEDLINE

## (undated) DEVICE — PK PROC TURNOVER